# Patient Record
Sex: MALE | Race: WHITE | NOT HISPANIC OR LATINO | Employment: OTHER | URBAN - METROPOLITAN AREA
[De-identification: names, ages, dates, MRNs, and addresses within clinical notes are randomized per-mention and may not be internally consistent; named-entity substitution may affect disease eponyms.]

---

## 2017-12-04 ENCOUNTER — TELEPHONE (OUTPATIENT)
Dept: CARDIOLOGY | Facility: CLINIC | Age: 82
End: 2017-12-04

## 2017-12-04 NOTE — TELEPHONE ENCOUNTER
Called daughter back gave her information made an appointment with Dr Alcantar at [facility] for 12/18/17 at 330 pm . Daughter stated that she understands and agrees.

## 2017-12-04 NOTE — TELEPHONE ENCOUNTER
Tech from [facility] called stated that Dr Rebollar wants patient to follow up with  Dr. Lopez  In Two weeks.  explained that there is no availability in 2 wks . Explained that Dr Lopez will be leaving [facility] for [facility] in January.   Dr Richey and Dr Velasco do not have any opening until February 2018.   Asked Dayday (sp) is she will ask Dr Rebollar if patient can be scheduled with the [facility]iology [facility].  Gave her the phone number [phone].  Andrea Case Long an d LI.

## 2017-12-04 NOTE — TELEPHONE ENCOUNTER
Called patient's daughter explained that [facility] does not have any soon appointment.  Will look for Dr Alcantar and Dr Gunderson in [facility].  Patient was seeing Dr Duenas and wants to change.

## 2017-12-04 NOTE — TELEPHONE ENCOUNTER
----- Message from Shea Hood LPN sent at 12/4/2017  3:04 PM CST -----  Contact: Chely/Nathalie Benoit  Ms. Borrero,     I am just sending this to you because I see where you had already charted on this pt.     ----- Message -----  From: Oren HECTOR Edouard  Sent: 12/4/2017   2:50 PM  To: Kaiden WAGONER Staff    Chely called in regarding the attached patient and wanted to schedule a hospital f/u with Dr. Richey but not sure if Dr. Gunderson has every seen this patient?  Patient is being discharged today 12/4 with a diagnoses of Non-Sustained VT .  Chely asked patient be called directly [phone] or daughter ( Oren Martinez ) [phone]

## 2017-12-04 NOTE — TELEPHONE ENCOUNTER
----- Message from Oren Nunn sent at 12/4/2017  2:50 PM CST -----  Contact: Chely/Nathalie [facility]  Chely called in regarding the attached patient and wanted to schedule a hospital f/u with Dr. Richey but not sure if Dr. Gunderson has every seen this patient?  Patient is being discharged today 12/4 with a diagnoses of Non-Sustained VT .  Chely asked patient be called directly [phone] or daughter ( Oren Martinez ) [phone]

## 2017-12-13 ENCOUNTER — OFFICE VISIT (OUTPATIENT)
Dept: FAMILY MEDICINE | Facility: CLINIC | Age: 82
End: 2017-12-13
Payer: MEDICARE

## 2017-12-13 VITALS
TEMPERATURE: 98 F | DIASTOLIC BLOOD PRESSURE: 60 MMHG | HEIGHT: 73 IN | BODY MASS INDEX: 27.83 KG/M2 | HEART RATE: 68 BPM | RESPIRATION RATE: 20 BRPM | SYSTOLIC BLOOD PRESSURE: 112 MMHG | WEIGHT: 210 LBS

## 2017-12-13 DIAGNOSIS — E78.2 MIXED HYPERLIPIDEMIA: ICD-10-CM

## 2017-12-13 DIAGNOSIS — G60.8 PERIPHERAL SENSORY NEUROPATHY: ICD-10-CM

## 2017-12-13 DIAGNOSIS — I48.0 INTERMITTENT ATRIAL FIBRILLATION: ICD-10-CM

## 2017-12-13 DIAGNOSIS — L89.42: ICD-10-CM

## 2017-12-13 DIAGNOSIS — J18.9 PNEUMONIA DUE TO INFECTIOUS ORGANISM, UNSPECIFIED LATERALITY, UNSPECIFIED PART OF LUNG: ICD-10-CM

## 2017-12-13 DIAGNOSIS — M19.90 ARTHRITIS: ICD-10-CM

## 2017-12-13 DIAGNOSIS — K21.9 GASTROESOPHAGEAL REFLUX DISEASE, ESOPHAGITIS PRESENCE NOT SPECIFIED: ICD-10-CM

## 2017-12-13 PROBLEM — F03.90 DEMENTIA: Status: ACTIVE | Noted: 2017-12-13

## 2017-12-13 PROBLEM — E78.5 HYPERLIPIDEMIA: Status: ACTIVE | Noted: 2017-12-13

## 2017-12-13 PROCEDURE — 99204 OFFICE O/P NEW MOD 45 MIN: CPT | Mod: ,,, | Performed by: FAMILY MEDICINE

## 2017-12-13 RX ORDER — MULTIVITAMIN
1 TABLET ORAL DAILY
COMMUNITY
End: 2019-04-23

## 2017-12-13 RX ORDER — ALBUTEROL SULFATE 0.63 MG/3ML
0.63 SOLUTION RESPIRATORY (INHALATION) EVERY 6 HOURS PRN
Status: SHIPPED | OUTPATIENT
Start: 2017-12-13 | End: 2018-12-13

## 2017-12-13 RX ORDER — ASPIRIN 81 MG/1
81 TABLET ORAL DAILY
COMMUNITY
End: 2019-07-08

## 2017-12-13 RX ORDER — CIPROFLOXACIN 500 MG/1
500 TABLET ORAL 2 TIMES DAILY
Qty: 20 TABLET | Refills: 0 | Status: SHIPPED | OUTPATIENT
Start: 2017-12-13 | End: 2019-01-07

## 2017-12-13 RX ORDER — DILTIAZEM HYDROCHLORIDE 240 MG/1
CAPSULE, COATED, EXTENDED RELEASE ORAL
Refills: 0 | COMMUNITY
Start: 2017-12-04 | End: 2019-03-13

## 2017-12-13 RX ORDER — LANOLIN ALCOHOL/MO/W.PET/CERES
100 CREAM (GRAM) TOPICAL DAILY
COMMUNITY

## 2017-12-13 RX ORDER — DIAPER,BRIEF,ADULT, DISPOSABLE
EACH MISCELLANEOUS
COMMUNITY
End: 2019-04-23

## 2017-12-13 RX ORDER — AMOXICILLIN 500 MG
2 CAPSULE ORAL DAILY
COMMUNITY
End: 2019-04-23

## 2017-12-13 RX ORDER — TROSPIUM CHLORIDE 20 MG/1
TABLET, FILM COATED ORAL
Refills: 3 | COMMUNITY
Start: 2017-12-08 | End: 2019-01-07

## 2017-12-13 RX ORDER — GABAPENTIN 300 MG/1
CAPSULE ORAL
COMMUNITY
Start: 2017-10-31 | End: 2019-03-13

## 2017-12-13 RX ORDER — TRAMADOL HYDROCHLORIDE AND ACETAMINOPHEN 37.5; 325 MG/1; MG/1
TABLET, FILM COATED ORAL
COMMUNITY
Start: 2017-09-21 | End: 2017-12-13 | Stop reason: ALTCHOICE

## 2017-12-13 RX ORDER — DIAZEPAM 2 MG/1
TABLET ORAL
Refills: 5 | COMMUNITY
Start: 2017-11-20 | End: 2019-03-13

## 2017-12-13 RX ORDER — SOTALOL HYDROCHLORIDE 80 MG/1
TABLET ORAL
Refills: 0 | COMMUNITY
Start: 2017-12-04 | End: 2018-01-23

## 2017-12-13 NOTE — PROGRESS NOTES
Subjective:       Patient ID: Pasquale eRes is a 89 y.o. male.    Chief Complaint: Pneumonia and Hospital Follow Up (fluid in lungs)    Patient is here as a follow-up from the hospital November 25 through December 4 for pneumonia. Had Bilateral pneumonia. Still coughing, nofever.  Hx of atrial fibrillation, in hospital, converted of the medication.  He is a DNR.  Previuosly with Dr. Duenas.      Pneumonia       Review of Systems    Objective:      Physical Exam   Constitutional: He is oriented to person, place, and time. He appears well-developed and well-nourished.   HENT:   Head: Normocephalic and atraumatic.   Right Ear: External ear normal.   Left Ear: External ear normal.   Nose: Nose normal.   Mouth/Throat: Oropharynx is clear and moist. No oropharyngeal exudate.   Eyes: Conjunctivae and EOM are normal. Pupils are equal, round, and reactive to light. Right eye exhibits no discharge. Left eye exhibits no discharge.   Neck: Normal range of motion. Neck supple. No thyromegaly present.   Cardiovascular: Normal rate, regular rhythm and intact distal pulses.  Exam reveals no gallop and no friction rub.    Murmur (gd1/6 NAHOMY) heard.  Pulmonary/Chest: Effort normal and breath sounds normal. No respiratory distress. He has no wheezes. He has no rales. He exhibits no tenderness.   Abdominal: Soft. Bowel sounds are normal. He exhibits no distension and no mass. There is no tenderness. There is no rebound and no guarding. No hernia.   Musculoskeletal: Normal range of motion. He exhibits no edema, tenderness or deformity.        Back:         Legs:  Neurological: He is alert and oriented to person, place, and time. He displays normal reflexes. No cranial nerve deficit or sensory deficit. He exhibits normal muscle tone. Coordination normal.   Skin: Skin is warm and dry. No rash noted. No erythema. No pallor.   Nursing note and vitals reviewed.      Assessment:       1. Peripheral sensory neuropathy -D/C neurontin. No painful  symptoms   2. Mixed hyperlipidemia    3. Gastroesophageal reflux disease, esophagitis presence not specified    4. Arthritis    5. Intermittent atrial fibrillation    6. Pneumonia due to infectious organism, unspecified laterality, unspecified part of lung    7. Pressure ulcer of contiguous region involving back and buttock, stage 2, unspecified laterality        Plan:       Pasquale was seen today for pneumonia and hospital follow up.    Diagnoses and all orders for this visit:    Peripheral sensory neuropathy  -     Ambulatory referral to Home Health    Mixed hyperlipidemia    Gastroesophageal reflux disease, esophagitis presence not specified- Change to Mylanta    Arthritis    Intermittent atrial fibrillation  -     Holter monitor - 24 hour; Future    Pneumonia due to infectious organism, unspecified laterality, unspecified part of lung  -     X-Ray Chest PA And Lateral; Future  -     Ambulatory referral to Home Health  -     NEBULIZER FOR HOME USE  -     NEBULIZER KIT (SUPPLIES) FOR HOME USE    Pressure ulcer of contiguous region involving back and buttock, stage 2, unspecified laterality  -     Ambulatory referral to Home Health    Other orders  -     ciprofloxacin HCl (CIPRO) 500 MG tablet; Take 1 tablet (500 mg total) by mouth 2 (two) times daily.  -     albuterol nebulizer solution 0.63 mg; Take 3 mLs (0.63 mg total) by nebulization every 6 (six) hours as needed for Wheezing.         Return in about 3 months (around 3/13/2018).

## 2017-12-18 ENCOUNTER — OFFICE VISIT (OUTPATIENT)
Dept: CARDIOLOGY | Facility: CLINIC | Age: 82
End: 2017-12-18
Payer: MEDICARE

## 2017-12-18 VITALS
HEART RATE: 59 BPM | BODY MASS INDEX: 27.83 KG/M2 | WEIGHT: 210 LBS | SYSTOLIC BLOOD PRESSURE: 110 MMHG | DIASTOLIC BLOOD PRESSURE: 60 MMHG | HEIGHT: 73 IN

## 2017-12-18 DIAGNOSIS — I48.20 CHRONIC ATRIAL FIBRILLATION: ICD-10-CM

## 2017-12-18 DIAGNOSIS — R00.2 PALPITATIONS: ICD-10-CM

## 2017-12-18 DIAGNOSIS — I10 ESSENTIAL HYPERTENSION: ICD-10-CM

## 2017-12-18 DIAGNOSIS — I48.0 PAROXYSMAL ATRIAL FIBRILLATION: ICD-10-CM

## 2017-12-18 DIAGNOSIS — R60.0 EDEMA LEG: ICD-10-CM

## 2017-12-18 PROCEDURE — 99214 OFFICE O/P EST MOD 30 MIN: CPT | Mod: S$PBB,,, | Performed by: INTERNAL MEDICINE

## 2017-12-18 PROCEDURE — 99213 OFFICE O/P EST LOW 20 MIN: CPT | Mod: PBBFAC,PO | Performed by: INTERNAL MEDICINE

## 2017-12-18 PROCEDURE — 93010 ELECTROCARDIOGRAM REPORT: CPT | Mod: S$PBB,,, | Performed by: INTERNAL MEDICINE

## 2017-12-18 PROCEDURE — 99999 PR PBB SHADOW E&M-EST. PATIENT-LVL III: CPT | Mod: PBBFAC,,, | Performed by: INTERNAL MEDICINE

## 2017-12-18 PROCEDURE — 93005 ELECTROCARDIOGRAM TRACING: CPT | Mod: PBBFAC,PO | Performed by: INTERNAL MEDICINE

## 2017-12-18 RX ORDER — CIPROFLOXACIN 500 MG/1
TABLET ORAL
COMMUNITY
Start: 2017-12-13 | End: 2019-01-07

## 2017-12-18 RX ORDER — METOPROLOL SUCCINATE 50 MG/1
50 TABLET, EXTENDED RELEASE ORAL DAILY
Qty: 90 TABLET | Refills: 3 | Status: SHIPPED | OUTPATIENT
Start: 2017-12-18 | End: 2019-03-13

## 2017-12-18 RX ORDER — HYDROCHLOROTHIAZIDE 25 MG/1
25 TABLET ORAL DAILY
Qty: 90 TABLET | Refills: 3 | Status: SHIPPED | OUTPATIENT
Start: 2017-12-18 | End: 2019-01-18 | Stop reason: SDUPTHER

## 2017-12-18 RX ORDER — TROSPIUM CHLORIDE 20 MG/1
TABLET, FILM COATED ORAL
Refills: 3 | COMMUNITY
Start: 2017-12-08 | End: 2018-08-06 | Stop reason: SDUPTHER

## 2017-12-18 RX ORDER — SOTALOL HYDROCHLORIDE 80 MG/1
TABLET ORAL
COMMUNITY
Start: 2017-12-04 | End: 2017-12-18 | Stop reason: ALTCHOICE

## 2017-12-18 RX ORDER — DILTIAZEM HYDROCHLORIDE 240 MG/1
CAPSULE, COATED, EXTENDED RELEASE ORAL
COMMUNITY
Start: 2017-12-04 | End: 2017-12-18

## 2017-12-18 RX ORDER — DIAZEPAM 2 MG/1
TABLET ORAL
COMMUNITY
Start: 2017-11-20 | End: 2019-01-07

## 2017-12-18 NOTE — LETTER
December 18, 2017      Shila Chi MD  [address]  [address]  [facility] [place] [address]           [facility]  [address] [facility] [address] [place] [address]  Phone: [phone]          Patient: Pasquale Rees   MR Number: [id number]   YOB: 1925   Date of Visit: 12/18/2017       Dear Dr. Shila Chi:    Thank you for referring Pasquale Rees to me for evaluation. Attached you will find relevant portions of my assessment and plan of care.    If you have questions, please do not hesitate to call me. I look forward to following Pasquale Rees along with you.    Sincerely,    Al Alcantar Jr., MD    Enclosure  CC:  No Recipients    If you would like to receive this communication electronically, please contact [email or web address] or [phone] to request more information on [employer] access.    For providers and/or their staff who would like to refer a patient to [facility], please contact us through our one-stop-shop provider referral line, [facility], at [phone].    If you feel you have received this communication in error or would no longer like to receive these types of communications, please e-mail [email or web address]

## 2017-12-18 NOTE — PROGRESS NOTES
Subjective:    Patient ID:  Pasquale Rees is a 89 y.o. male who presents for evaluation of new pt (new pt) and ref from Dr Guadalupe      HPI 89 yo WM with recent hospitalization for pneumonia and found to be in AF. Patient has no palpitations, CP or SOB. Has chronic ankle edema. Family takes good care of him and were here to discuss long term management of the AF and risk vs benefit of anticoagulation. They were concerned because of fall hx.      Review of Systems   Constitution: Positive for malaise/fatigue. Negative for decreased appetite, fever, weakness, weight gain and weight loss.   HENT: Negative for hearing loss and nosebleeds.    Eyes: Negative for visual disturbance.   Cardiovascular: Positive for leg swelling. Negative for chest pain, claudication, cyanosis, dyspnea on exertion, irregular heartbeat, near-syncope, orthopnea, palpitations, paroxysmal nocturnal dyspnea and syncope.   Respiratory: Negative for cough, hemoptysis, shortness of breath, sleep disturbances due to breathing, snoring and wheezing.    Endocrine: Negative for cold intolerance, heat intolerance, polydipsia and polyuria.   Hematologic/Lymphatic: Negative for adenopathy and bleeding problem. Does not bruise/bleed easily.   Skin: Negative for color change, itching, poor wound healing, rash and suspicious lesions.   Musculoskeletal: Positive for arthritis. Negative for back pain, falls, joint pain, joint swelling, muscle cramps, muscle weakness and myalgias.   Gastrointestinal: Negative for bloating, abdominal pain, change in bowel habit, constipation, flatus, heartburn, hematemesis, hematochezia, hemorrhoids, jaundice, melena, nausea and vomiting.   Genitourinary: Negative for bladder incontinence, decreased libido, frequency, hematuria, hesitancy and urgency.   Neurological: Positive for loss of balance. Negative for brief paralysis, difficulty with concentration, excessive daytime sleepiness, dizziness, focal weakness, headaches,  "light-headedness, numbness and vertigo.   Psychiatric/Behavioral: Negative for altered mental status, depression and memory loss. The patient does not have insomnia and is not nervous/anxious.    Allergic/Immunologic: Negative for environmental allergies, hives and persistent infections.        Objective:    Physical Exam   Constitutional: He is oriented to person, place, and time. He appears well-developed and well-nourished. No distress.   /60 (BP Location: Left arm, Patient Position: Sitting, BP Method: Large (Automatic))   Pulse (!) 59   Ht 6' 1" (1.854 m)   Wt 95.3 kg (210 lb)   BMI 27.71 kg/m²      HENT:   Head: Normocephalic and atraumatic.   Eyes: Conjunctivae and lids are normal. Pupils are equal, round, and reactive to light. Right eye exhibits no discharge. No scleral icterus.   Neck: Normal range of motion. Neck supple. No JVD present. No tracheal deviation present. No thyromegaly present.   Cardiovascular: Normal rate, S1 normal, S2 normal, normal heart sounds and intact distal pulses.  A regularly irregular rhythm present.  Occasional extrasystoles are present. Exam reveals decreased pulses. Exam reveals no gallop and no friction rub.    No murmur heard.  Pulses:       Carotid pulses are 2+ on the right side, and 2+ on the left side.       Radial pulses are 2+ on the right side, and 2+ on the left side.        Femoral pulses are 2+ on the right side, and 2+ on the left side.       Popliteal pulses are 2+ on the right side, and 2+ on the left side.        Dorsalis pedis pulses are 2+ on the right side, and 2+ on the left side.        Posterior tibial pulses are 2+ on the right side, and 2+ on the left side.   Pulmonary/Chest: Effort normal and breath sounds normal. No respiratory distress. He has no wheezes. He has no rales. He exhibits no tenderness.   Abdominal: Soft. Bowel sounds are normal. He exhibits no distension and no mass. There is no hepatosplenomegaly or hepatomegaly. There is no " tenderness. There is no rebound and no guarding.   Musculoskeletal: Normal range of motion. He exhibits edema. He exhibits no tenderness.   Ankle edema   Lymphadenopathy:     He has no cervical adenopathy.   Neurological: He is alert and oriented to person, place, and time. He has normal reflexes. No cranial nerve deficit. Coordination normal.   Skin: Skin is warm and dry. No rash noted. He is not diaphoretic. No erythema. No pallor.   Psychiatric: He has a normal mood and affect. His speech is normal and behavior is normal. Judgment and thought content normal. Cognition and memory are normal.         Assessment:       1. Fall risk   2. Paroxysmal atrial fibrillation    3. Edema leg    4. Essential hypertension         Plan:    Dc sotolol and diltiazem   Restart metoprolol 50 mg  HCTZ 25 mg daily with leg elevation and support stockings    Risk of stroke from atrial fib has been discussed as well as bleeding risk from alternative anticoagulation regiments as well as risk and benefits of rate control vs cardioversion   TENA-VASC 3 3.2% stroke risk    Family and patient elected not to use anticoagulation     Orders Placed This Encounter   Procedures    Basic metabolic panel     Return in about 6 weeks (around 1/29/2018).

## 2017-12-21 DIAGNOSIS — R00.2 PALPITATIONS: Primary | ICD-10-CM

## 2018-01-11 ENCOUNTER — TELEPHONE (OUTPATIENT)
Dept: CARDIOLOGY | Facility: CLINIC | Age: 83
End: 2018-01-11

## 2018-01-11 DIAGNOSIS — I48.20 CHRONIC ATRIAL FIBRILLATION: Primary | ICD-10-CM

## 2018-01-11 NOTE — TELEPHONE ENCOUNTER
Left message on voicemail for pts daughter, letting them know blood work has been placed in system.

## 2018-01-11 NOTE — TELEPHONE ENCOUNTER
----- Message from Marielle Solorio MA sent at 1/10/2018  3:04 PM CST -----  Contact: Meche Martinez-Daughter  Can you please put order in for blood work to be done before pt is seen, thank you?  ----- Message -----  From: Amelia Del Cid  Sent: 1/10/2018   2:07 PM  To: Katharine YOUNG Jr Staff    Meche Martinez- pt Daughter calling states would like a call,pt have home health and would like blood work orders sent to the [facility] health please before pt comes in ,this week sometime....[phone]

## 2018-01-19 ENCOUNTER — TELEPHONE (OUTPATIENT)
Dept: FAMILY MEDICINE | Facility: CLINIC | Age: 83
End: 2018-01-19

## 2018-01-19 LAB
ALBUMIN SERPL-MCNC: 3.5 G/DL (ref 3.1–4.7)
ALP SERPL-CCNC: 87 IU/L (ref 40–104)
ALT (SGPT): 18 IU/L (ref 3–33)
AST SERPL-CCNC: 23 IU/L (ref 10–40)
BASOPHILS NFR BLD: 0.1 K/UL (ref 0–0.2)
BASOPHILS NFR BLD: 0.5 %
BILIRUB SERPL-MCNC: 0.6 MG/DL (ref 0.3–1)
BUN SERPL-MCNC: 16 MG/DL (ref 8–20)
CALCIUM SERPL-MCNC: 9.1 MG/DL (ref 7.7–10.4)
CHLORIDE: 102 MMOL/L (ref 98–110)
CO2 SERPL-SCNC: 31.8 MMOL/L (ref 22.8–31.6)
CREATININE: 0.79 MG/DL (ref 0.6–1.4)
EOSINOPHIL NFR BLD: 0.5 K/UL (ref 0–0.7)
EOSINOPHIL NFR BLD: 4.3 %
ERYTHROCYTE [DISTWIDTH] IN BLOOD BY AUTOMATED COUNT: 15.7 % (ref 11.7–14.9)
GLUCOSE: 103 MG/DL (ref 70–99)
GRAN #: 6.6 K/UL (ref 1.4–6.5)
GRAN%: 61.8 %
HCT VFR BLD AUTO: 38.8 % (ref 39–55)
HGB BLD-MCNC: 12.6 G/DL (ref 14–16)
IMMATURE GRANS (ABS): 0.1 K/UL (ref 0–1)
IMMATURE GRANULOCYTES: 0.5 %
LYMPH #: 2.9 K/UL (ref 1.2–3.4)
LYMPH%: 26.8 %
MCH RBC QN AUTO: 28.8 PG (ref 25–35)
MCHC RBC AUTO-ENTMCNC: 32.5 G/DL (ref 31–36)
MCV RBC AUTO: 88.6 FL (ref 80–100)
MONO #: 0.7 K/UL (ref 0.1–0.6)
MONO%: 6.1 %
NUCLEATED RBCS: 0 %
PLATELET # BLD AUTO: 249 K/UL (ref 140–440)
PMV BLD AUTO: 8.9 FL (ref 8.8–12.7)
POTASSIUM SERPL-SCNC: 3.5 MMOL/L (ref 3.5–5)
PROT SERPL-MCNC: 7.3 G/DL (ref 6–8.2)
RBC # BLD AUTO: 4.38 M/UL (ref 4.3–5.9)
SODIUM: 142 MMOL/L (ref 134–144)
WBC # BLD AUTO: 10.7 K/UL (ref 5–10)

## 2018-01-19 NOTE — TELEPHONE ENCOUNTER
----- Message from Alex Guadalupe MD sent at 1/19/2018 12:49 PM CST -----  Mild elevated blood sugar. 2200-calorie diabetic diet and rechecking 3 months for A1c and chemistries

## 2018-01-22 ENCOUNTER — TELEPHONE (OUTPATIENT)
Dept: CARDIOLOGY | Facility: CLINIC | Age: 83
End: 2018-01-22

## 2018-01-22 DIAGNOSIS — J18.9 PNEUMONIA OF LOWER LOBE DUE TO INFECTIOUS ORGANISM, UNSPECIFIED LATERALITY: Primary | ICD-10-CM

## 2018-01-22 NOTE — TELEPHONE ENCOUNTER
Appt [facility] for pt. For chest xray.     ----- Message from Amelia Del Cid sent at 1/22/2018 10:06 AM CST -----  Contact: Daughter Meche  Pt daughter Meche calling states pt has appointment tomorrow and she is requesting a chest xray be done since pt had pneumonia a couple weeks so would like to see how his lungs are doing....[phone]

## 2018-01-22 NOTE — TELEPHONE ENCOUNTER
----- Message from Marielle Solorio MA sent at 1/22/2018 10:51 AM CST -----  Contact: Daughter Meche  Family would like Chest xray done before appt.   ----- Message -----  From: Amelia Del Cid  Sent: 1/22/2018  10:06 AM  To: Katharine YOUNG Jr Staff    Pt daughter Meche calling states pt has appointment tomorrow and she is requesting a chest xray be done since pt had pneumonia a couple weeks so would like to see how his lungs are doing....[phone]

## 2018-01-23 ENCOUNTER — HOSPITAL ENCOUNTER (OUTPATIENT)
Dept: RADIOLOGY | Facility: HOSPITAL | Age: 83
Discharge: HOME | End: 2018-01-23
Attending: INTERNAL MEDICINE
Payer: MEDICARE

## 2018-01-23 ENCOUNTER — OFFICE VISIT (OUTPATIENT)
Dept: CARDIOLOGY | Facility: CLINIC | Age: 83
End: 2018-01-23
Payer: MEDICARE

## 2018-01-23 ENCOUNTER — TELEPHONE (OUTPATIENT)
Dept: CARDIOLOGY | Facility: CLINIC | Age: 83
End: 2018-01-23

## 2018-01-23 ENCOUNTER — TELEPHONE (OUTPATIENT)
Dept: FAMILY MEDICINE | Facility: CLINIC | Age: 83
End: 2018-01-23

## 2018-01-23 VITALS
HEART RATE: 83 BPM | BODY MASS INDEX: 27.47 KG/M2 | HEIGHT: 73 IN | DIASTOLIC BLOOD PRESSURE: 80 MMHG | WEIGHT: 207.25 LBS | SYSTOLIC BLOOD PRESSURE: 136 MMHG

## 2018-01-23 DIAGNOSIS — R60.0 EDEMA LEG: ICD-10-CM

## 2018-01-23 DIAGNOSIS — I10 ESSENTIAL HYPERTENSION: Primary | ICD-10-CM

## 2018-01-23 DIAGNOSIS — I48.0 PAROXYSMAL ATRIAL FIBRILLATION: ICD-10-CM

## 2018-01-23 DIAGNOSIS — J18.9 PNEUMONIA OF LOWER LOBE DUE TO INFECTIOUS ORGANISM, UNSPECIFIED LATERALITY: ICD-10-CM

## 2018-01-23 PROCEDURE — 99999 PR PBB SHADOW E&M-EST. PATIENT-LVL IV: CPT | Mod: PBBFAC,,, | Performed by: INTERNAL MEDICINE

## 2018-01-23 PROCEDURE — 71046 X-RAY EXAM CHEST 2 VIEWS: CPT | Mod: TC,FY,PO

## 2018-01-23 PROCEDURE — 99214 OFFICE O/P EST MOD 30 MIN: CPT | Mod: PBBFAC,PO | Performed by: INTERNAL MEDICINE

## 2018-01-23 PROCEDURE — 99214 OFFICE O/P EST MOD 30 MIN: CPT | Mod: S$PBB,,, | Performed by: INTERNAL MEDICINE

## 2018-01-23 PROCEDURE — 71046 X-RAY EXAM CHEST 2 VIEWS: CPT | Mod: 26,,, | Performed by: RADIOLOGY

## 2018-01-23 NOTE — TELEPHONE ENCOUNTER
Spoke with daughter to let her know we have test results.       ----- Message from Mildred Navarro sent at 1/23/2018  9:48 AM CST -----  Daughter (Oren Martinez)requesting to speak with nurse concerning patient's lab test results that were done by [facility]/has question/please call back at [phone] (cellphone)to advise.

## 2018-01-23 NOTE — PROGRESS NOTES
Subjective:    Patient ID:  Pasquale Rees is a 89 y.o. male who presents for evaluation of Follow-up (follow up ) and Results      HPI  89 yo WM with recent hospitalization for pneumonia and found to be in AF. Patient has no palpitations, CP or SOB. Has chronic ankle edema. Family takes good care of him and were here to discuss long term management of the AF and risk vs benefit of anticoagulation.Last visit placed him on metoprolol and HCTZ. Family opted to not anticoagulate. He is doing well today for his age. He denies any palpitations. Still gets some lower extremity edema but improved.    Review of Systems   Constitution: Negative for decreased appetite, fever, weakness, malaise/fatigue, weight gain and weight loss.   HENT: Negative for hearing loss and nosebleeds.    Eyes: Negative for visual disturbance.   Cardiovascular: Positive for dyspnea on exertion and leg swelling. Negative for chest pain, claudication, cyanosis, irregular heartbeat, near-syncope, orthopnea, palpitations, paroxysmal nocturnal dyspnea and syncope.   Respiratory: Negative for cough, hemoptysis, shortness of breath, sleep disturbances due to breathing, snoring and wheezing.    Endocrine: Negative for cold intolerance, heat intolerance, polydipsia and polyuria.   Hematologic/Lymphatic: Negative for adenopathy and bleeding problem. Does not bruise/bleed easily.   Skin: Negative for color change, itching, poor wound healing, rash and suspicious lesions.   Musculoskeletal: Negative for arthritis, back pain, falls, joint pain, joint swelling, muscle cramps, muscle weakness and myalgias.   Gastrointestinal: Negative for bloating, abdominal pain, change in bowel habit, constipation, flatus, heartburn, hematemesis, hematochezia, hemorrhoids, jaundice, melena, nausea and vomiting.   Genitourinary: Negative for bladder incontinence, decreased libido, frequency, hematuria, hesitancy and urgency.   Neurological: Negative for brief paralysis,  "difficulty with concentration, excessive daytime sleepiness, dizziness, focal weakness, headaches, light-headedness, loss of balance, numbness and vertigo.   Psychiatric/Behavioral: Negative for altered mental status, depression and memory loss. The patient does not have insomnia and is not nervous/anxious.    Allergic/Immunologic: Negative for environmental allergies, hives and persistent infections.        Objective:    Physical Exam   Constitutional: He is oriented to person, place, and time. He appears well-developed and well-nourished. No distress.   /80 (BP Location: Left arm, Patient Position: Sitting, BP Method: Large (Automatic))   Pulse 83   Ht 6' 1" (1.854 m)   Wt 94 kg (207 lb 3.7 oz)   BMI 27.34 kg/m²      HENT:   Head: Normocephalic and atraumatic.   Eyes: Conjunctivae and lids are normal. Pupils are equal, round, and reactive to light. Right eye exhibits no discharge. No scleral icterus.   Neck: Normal range of motion. Neck supple. No JVD present. No tracheal deviation present. No thyromegaly present.   Cardiovascular: Normal rate, S1 normal, S2 normal, normal heart sounds, intact distal pulses and normal pulses.  An irregularly irregular rhythm present. Exam reveals no gallop and no friction rub.    No murmur heard.  Pulses:       Carotid pulses are 2+ on the right side, and 2+ on the left side.       Radial pulses are 2+ on the right side, and 2+ on the left side.        Femoral pulses are 2+ on the right side, and 2+ on the left side.       Popliteal pulses are 2+ on the right side, and 2+ on the left side.        Dorsalis pedis pulses are 2+ on the right side, and 2+ on the left side.        Posterior tibial pulses are 2+ on the right side, and 2+ on the left side.   Pulmonary/Chest: Effort normal and breath sounds normal. No respiratory distress. He has no wheezes. He has no rales. He exhibits no tenderness.   Abdominal: Soft. Bowel sounds are normal. He exhibits no distension and no " mass. There is no hepatosplenomegaly or hepatomegaly. There is no tenderness. There is no rebound and no guarding.   Musculoskeletal: Normal range of motion. He exhibits edema. He exhibits no tenderness.   1+ to trace   Lymphadenopathy:     He has no cervical adenopathy.   Neurological: He is alert and oriented to person, place, and time. He has normal reflexes. No cranial nerve deficit. Coordination normal.   Skin: Skin is warm and dry. No rash noted. He is not diaphoretic. No erythema. No pallor.   Psychiatric: He has a normal mood and affect. His speech is normal and behavior is normal. Judgment and thought content normal. Cognition and memory are normal.         Assessment:       1. Essential hypertension    2. Paroxysmal atrial fibrillation    3. Edema leg         Plan:     Patient probably in chronic atria fib now. Tolerating rate control    Not anticoagulated because of dementia, fall risk, and family's decision    No orders of the defined types were placed in this encounter.    Follow-up in about 6 months (around 7/23/2018).

## 2018-03-12 ENCOUNTER — TELEPHONE (OUTPATIENT)
Dept: CARDIOLOGY | Facility: CLINIC | Age: 83
End: 2018-03-12

## 2018-03-12 NOTE — TELEPHONE ENCOUNTER
----- Message from Marielle Solorio MA sent at 3/12/2018  1:51 PM CDT -----  Contact: [facility]  Please advise if home health needs to do anything   ----- Message -----  From: Rosita Ordoñez  Sent: 3/12/2018   1:40 PM  To: Katharine YOUNG  Staff    Patient is gaining weight today 216 and 217 on 2/27.  Please be advised and if needed call back at [phone].  Thank you!

## 2018-06-04 ENCOUNTER — TELEPHONE (OUTPATIENT)
Dept: FAMILY MEDICINE | Facility: CLINIC | Age: 83
End: 2018-06-04

## 2018-06-04 LAB
BUN SERPL-MCNC: 17 MG/DL (ref 8–20)
CALCIUM SERPL-MCNC: 9.1 MG/DL (ref 7.7–10.4)
CHLORIDE: 99 MMOL/L (ref 98–110)
CO2 SERPL-SCNC: 33.2 MMOL/L (ref 22.8–31.6)
CREATININE: 1.03 MG/DL (ref 0.6–1.4)
GLUCOSE: 129 MG/DL (ref 70–99)
POTASSIUM SERPL-SCNC: 3.5 MMOL/L (ref 3.5–5)
SODIUM: 141 MMOL/L (ref 134–144)

## 2018-06-04 NOTE — TELEPHONE ENCOUNTER
cody by Carol because patient had a reported 12 pound weight gainover the last week of his had no chest pains or shortness of breath he has a history of atrial fibrillation but she says his rate is regular today and is not tachycardic and have no capabilities to do EKG at the bedside. I instructed her to give him a short course of Lasix 10 mg every morning for 5 days, and to call me if there is any change in his vital signs or any change in his heart rhythm or distress if his symptoms do not improve after several days he is to go to the ER.

## 2018-06-04 NOTE — TELEPHONE ENCOUNTER
Carol with [facility] called to state she saw the patient on Friday and his weight is increasing. Weight on admit 5/25/18 was 198lb and its now 210lbs as of Friday. No increase SOB, O2 95%, VS ok. Has 2-3 plus pitting bilateral extremities.

## 2018-07-09 ENCOUNTER — TELEPHONE (OUTPATIENT)
Dept: FAMILY MEDICINE | Facility: CLINIC | Age: 83
End: 2018-07-09

## 2018-07-09 NOTE — TELEPHONE ENCOUNTER
Briana with [facility] called regarding patient.  She is reporting a 3 # weight gain since last week and +2 pitting edema in lower extremities.  Please call Briana   [phone]

## 2018-08-06 RX ORDER — TROSPIUM CHLORIDE 20 MG/1
20 TABLET, FILM COATED ORAL 2 TIMES DAILY
Qty: 60 TABLET | Refills: 3 | Status: SHIPPED | OUTPATIENT
Start: 2018-08-06 | End: 2019-03-25 | Stop reason: SDUPTHER

## 2018-08-09 RX ORDER — TROSPIUM CHLORIDE 20 MG/1
TABLET, FILM COATED ORAL
Qty: 30 TABLET | Refills: 3 | Status: SHIPPED | OUTPATIENT
Start: 2018-08-09 | End: 2019-01-07

## 2018-08-10 NOTE — TELEPHONE ENCOUNTER
The following medication needs a prior authorization: see request from pharmacy in [place]     Medication Name: trospium Chloride   Dosage: 20 mg     Frequency: everyday     Directions for use: one tablet po q day    Diagnosis: r39.81  N32.81 OAB    Is the request for a reauthorization? no    Is the patient currently stable on therapy? Yes patient has been stable on medication for over a year     Please list all therapeutic alternatives previously used with start/end dates and outcome:    Oxybutynin failed

## 2018-08-27 ENCOUNTER — LAB VISIT (OUTPATIENT)
Dept: LAB | Facility: HOSPITAL | Age: 83
End: 2018-08-27
Attending: RADIOLOGY
Payer: MEDICARE

## 2018-08-27 DIAGNOSIS — C43.39 MALIGNANT MELANOMA OF SKIN OF CHEEK (EXTERNAL): Primary | ICD-10-CM

## 2018-08-27 LAB
BUN SERPL-MCNC: 18 MG/DL
CREAT SERPL-MCNC: 0.95 MG/DL
EST. GFR  (AFRICAN AMERICAN): >60 ML/MIN/1.73 M^2
EST. GFR  (NON AFRICAN AMERICAN): >60 ML/MIN/1.73 M^2

## 2018-08-27 PROCEDURE — 84520 ASSAY OF UREA NITROGEN: CPT | Mod: PN

## 2018-08-27 PROCEDURE — 82565 ASSAY OF CREATININE: CPT | Mod: PN

## 2018-08-27 PROCEDURE — 36415 COLL VENOUS BLD VENIPUNCTURE: CPT | Mod: PN

## 2018-08-27 PROCEDURE — 84520 ASSAY OF UREA NITROGEN: CPT

## 2018-08-27 PROCEDURE — 82565 ASSAY OF CREATININE: CPT

## 2018-09-06 NOTE — PROGRESS NOTES
"Patient: Pasquale Rees  Date of visit: 6/11/2018    Referring MD: Brijesh Dumas MD  Radiation Oncology: MD Scarlet  Medical Oncology:  Cardiologist: Dr Alcantar  PCP/Internist:  Dermatologist: Dr María Novoa  Dentist: Dr Rufus Wang    HPI:  Patient is an elderly male with a hx of SCCA left face S/P Moh's procedure in the past about 2 years ago (Dr Newsome--[place], [place] Negative margins. Closed primarily.  He presented with a "knot" on his left face a few months ago which continues to grow. No DC, pain, ulceration. No facial numbness or weakness.  He was seen by Dr. FARNAZ Dumas who evaluated patient: FNA + SCCA; PET CT neck, CT neck with contrast  He now presents with a new lesion near his left sideburn with a subcutaneous mass anterior face and mass in tail of parotid and an enlarged node left upper neck.    He also has another lesion in his left sideburn for which Moh's surgery has been put on hold--path ??    Upon review of his PMH it was discovered that his cardiac arrhythmias presented only when he was admitted to [facility] for pneumonia. He denies dyspnea. He also denies a cardiac history and chest pain. His diagnosis of dementia again was made when he was "sun downing" in the ICU. His family states that his mental status is clear--no dementia.    He was hospitalized twice for penumonia: 11/27/2017--[facility]--pneumonia following flu  5/2018-[place]--aspiration--MBSS was performed at that time.  All believe that this was due to eating too fast while simultaneously talking.  He occasionally coughs with food.    ROS: ALSO SEE HPI    ROS: Review of Systems   Constitutional: Negative.    HENT: Negative for congestion, ear discharge, ear pain, hearing loss, nosebleeds, sinus pain and sore throat.    Eyes: Negative.   Swallowing: Negative for dysphagia and odynophagia   Respiratory: Negative.  Negative for stridor and dyspnea    Cardiovascular: Negative. "    Gastrointestinal: Negative.    Genitourinary: Negative.    Musculoskeletal: Negative for falls, + upper and lower back pain   Skin: Negative.    Neurological: Negative; no facial numbness or weakness.    Endo/Heme/Allergies: Negative for polydipsia. Does not bruise/bleed easily.   Psychiatric/Behavioral: Negative.  Substance abuse:  Negative    Constitutional  · General Appearance: well nourished, well-developed, alert, oriented, in no acute distress; elderly male  · Communication: ability, understanding, voice quality normal  Head and Face  · Inspection: normocephalic, 2 X 2 skin lesion left sideburn covered with hair; 4 X 4 cm subQ nodule with atrophy of overlying skin located anterior to the parotid gland; mobile; NT; well healed vertical incision left preauricular area   · Palpation: no stepoffs, sinus tenderness or masses  · Parotid glands: firm mass tail of left parotid at posterior left AOM  Eyes  · Ocular Motility / Alignment: normal alignment, motility, no proptosis, enophthalmus or nystagmus  · Conjunctiva: not injected  · Eyelids: no hooding, lag, entropion, or ectropion  Ears  · Hearing: speech reception thresholds grossly normal  · External Ears: no auricle lesions, non-tender, mobile to palpation  · Otoscopy:  · Right Ear: no tympanic membrane lesions, perforations, or effusion, normal EAC  · Left Ear: no tympanic membrane lesions, perforations, or effusion, normal EAC  Nose  · External Nose: no lesions, tenderness, trauma or deformity  · Intranasal Exam: no edema, erythema, discharge, mass or obstruction  Oral Cavity / Oropharynx  · Lips: upper and lower lips pink and moist  · Teeth: dentition: broken tooth #15; black tooth #1 but intact; few mandibular teeth missing  · Gingiva: healthy  · Oral Mucosa: moist, no mucosal lesions  · Floor of Mouth: normal, no lesions, salivary ducts patent  · Tongue: moist, normal mobility, no lesions  · Palate: soft and hard palates without lesions or  ulcers  Oropharynx: tonsils and walls without erythema, exudate, base of tongue soft to palpation  Nasopharynx, Hypopharynx, and Larynx  · Indirect: larynx, hypopharynx, base of tongue normal  Neck  · Inspection and Palpation: no erythema, induration, emphysema, tenderness or masses; minimal neck extension  · Larynx and Trachea: normal position, mobility and crepitus  · Thyroid: no tenderness, enlargement or nodules  · Submandibular Glands: no masses or tenderness  Lymphatic:  · Anterior, Posterior, Submandibular, Submental, Supraclavicular: enlarged left level 2 node  Chest / Respiratory  · Chest: no stridor or retractions, normal effort and expansion  Cardiovascular:  · Pulses: 2+ carotid pulses bilaterally  · Auscultation: deferred  Neurological  · Cranial Nerves: grossly intact  · General: no focal deficits  Psychiatric  · Orientation: oriented to time, place and person; no signs of dementia  · Mood and Affect: no depression, anxiety or agitation  Extremities  · Inspection: Moves all extremities well  Donor site  · Chest, Back, Abdomen, Arms, Legs: N/A      PERTINENT DATA:  LABS:  AUDIO:  PATH: SCCA  IMAGING:  US:  CT: same as PET CT  MRI:  PET CT: no distant mets; lesion right face, tail of parotid and questionable level 2 node vs  additional mass parotid       I personally reviewed the images: PET CT, CT neck with contrast     I personally reviewed: PET CT; CT neck with contrast    Assessment:  SCCA skin left sideburn area  Sub Q deposit SCCA left lower face anterior to parotid with mets to parotid and level 2 neck    Plan:  Treatment options discussed: do nothing, radiation only, surgery( WLE of SCCA left sideburn area, WLE sub Q deposit of SCCA left lower face, parotidectomy, neck dissection, cervicofacial advancement flap) + radiation therapy, the latter being the standard of care  Risks/complications, benefits of surgery ad alternatives discussed at length with patient and his family including discussion  of anatomy and all questions were answered  He will meet again with Dr Novak to discuss risks and benefits of XRT and its efficacy if he proceeds with radiation therapy only  F/U 2 weeks after speaking with Dr Novak    I spent 55 minutes face to face with patient discussing diagnosis and treatment plan and answering questions which was > 50% of the time examining patient.

## 2018-09-11 ENCOUNTER — OFFICE VISIT (OUTPATIENT)
Dept: OTOLARYNGOLOGY | Facility: CLINIC | Age: 83
End: 2018-09-11
Payer: MEDICARE

## 2018-09-11 VITALS
BODY MASS INDEX: 28.49 KG/M2 | DIASTOLIC BLOOD PRESSURE: 72 MMHG | WEIGHT: 215 LBS | HEIGHT: 73 IN | SYSTOLIC BLOOD PRESSURE: 122 MMHG

## 2018-09-11 DIAGNOSIS — C44.300 SKIN CANCER OF FACE: ICD-10-CM

## 2018-09-11 PROBLEM — I48.0 PAROXYSMAL ATRIAL FIBRILLATION: Status: RESOLVED | Noted: 2017-12-18 | Resolved: 2018-09-11

## 2018-09-11 PROBLEM — I48.0 INTERMITTENT ATRIAL FIBRILLATION: Status: RESOLVED | Noted: 2017-12-13 | Resolved: 2018-09-11

## 2018-09-11 PROCEDURE — 99205 OFFICE O/P NEW HI 60 MIN: CPT | Mod: S$PBB,,, | Performed by: OTOLARYNGOLOGY

## 2018-09-11 PROCEDURE — 99215 OFFICE O/P EST HI 40 MIN: CPT | Mod: PBBFAC,PO | Performed by: OTOLARYNGOLOGY

## 2018-09-11 PROCEDURE — 99999 PR PBB SHADOW E&M-EST. PATIENT-LVL V: CPT | Mod: PBBFAC,,, | Performed by: OTOLARYNGOLOGY

## 2018-09-11 NOTE — LETTER
September 11, 2018      Brijesh Dumas MD  [address]  [address]  [facility] [address]           [facility]  [address] [facility] [address] [place] [address]  Phone: [phone]  Fax: [phone]          Patient: Pasquale Rees   MR Number: [id number]   YOB: 1925   Date of Visit: 9/11/2018       Dear Dr. Brijesh Dumas:    Thank you for referring Pasquale Rees to me for evaluation. Attached you will find relevant portions of my assessment and plan of care.    If you have questions, please do not hesitate to call me. I look forward to following Pasquale Rees along with you.    Sincerely,    Shantel Simms MD    Enclosure  CC:  No Recipients    If you would like to receive this communication electronically, please contact [email or web address] or [phone] to request more information on [employer] Link access.    For providers and/or their staff who would like to refer a patient to [facility], please contact us through our one-stop-shop provider referral line, [facility], at [phone].    If you feel you have received this communication in error or would no longer like to receive these types of communications, please e-mail [email or web address]

## 2018-09-12 ENCOUNTER — TELEPHONE (OUTPATIENT)
Dept: OTOLARYNGOLOGY | Facility: CLINIC | Age: 83
End: 2018-09-12

## 2018-09-12 NOTE — TELEPHONE ENCOUNTER
----- Message from Shantel Simms MD sent at 9/11/2018  5:10 PM CDT -----  Please send note to care team.  I sent it electronically already but don't know if received  Thanks

## 2018-10-11 ENCOUNTER — LAB VISIT (OUTPATIENT)
Dept: LAB | Facility: HOSPITAL | Age: 83
End: 2018-10-11
Attending: RADIOLOGY
Payer: MEDICARE

## 2018-10-11 ENCOUNTER — TELEPHONE (OUTPATIENT)
Dept: OTOLARYNGOLOGY | Facility: CLINIC | Age: 83
End: 2018-10-11

## 2018-10-11 DIAGNOSIS — C44.329 SQUAMOUS CELL CARCINOMA OF CHIN: Primary | ICD-10-CM

## 2018-10-11 LAB
BUN SERPL-MCNC: 19 MG/DL
CREAT SERPL-MCNC: 1.04 MG/DL
EST. GFR  (AFRICAN AMERICAN): >60 ML/MIN/1.73 M^2
EST. GFR  (NON AFRICAN AMERICAN): >60 ML/MIN/1.73 M^2

## 2018-10-11 PROCEDURE — 84520 ASSAY OF UREA NITROGEN: CPT

## 2018-10-11 PROCEDURE — 84520 ASSAY OF UREA NITROGEN: CPT | Mod: PN

## 2018-10-11 PROCEDURE — 82565 ASSAY OF CREATININE: CPT | Mod: PN

## 2018-10-11 PROCEDURE — 82565 ASSAY OF CREATININE: CPT

## 2018-10-11 PROCEDURE — 36415 COLL VENOUS BLD VENIPUNCTURE: CPT | Mod: PN

## 2018-10-11 NOTE — TELEPHONE ENCOUNTER
I spoke with Dr Novak regarding patient.  I had not heard back from him regarding his decision regarding treatment.  He has decided to proceed with non surgical therapy, i.e. , radiation therapy.

## 2018-12-04 ENCOUNTER — OFFICE VISIT (OUTPATIENT)
Dept: FAMILY MEDICINE | Facility: CLINIC | Age: 83
End: 2018-12-04
Payer: MEDICARE

## 2018-12-04 VITALS
BODY MASS INDEX: 27.77 KG/M2 | RESPIRATION RATE: 16 BRPM | DIASTOLIC BLOOD PRESSURE: 64 MMHG | HEART RATE: 73 BPM | HEIGHT: 73 IN | WEIGHT: 209.5 LBS | SYSTOLIC BLOOD PRESSURE: 122 MMHG | OXYGEN SATURATION: 95 %

## 2018-12-04 DIAGNOSIS — R05.9 COUGH: Primary | ICD-10-CM

## 2018-12-04 PROCEDURE — 99213 OFFICE O/P EST LOW 20 MIN: CPT | Mod: ,,, | Performed by: NURSE PRACTITIONER

## 2018-12-04 RX ORDER — GUAIFENESIN 1200 MG/1
1 TABLET, EXTENDED RELEASE ORAL 2 TIMES DAILY
Qty: 60 TABLET | COMMUNITY
Start: 2018-12-04 | End: 2018-12-14

## 2018-12-04 NOTE — PROGRESS NOTES
SUBJECTIVE:      Patient ID: Pasquale Rees is a 89 y.o. male.    Chief Complaint: Cough    Mr. Rees is here today for evaluation of a cough.  He reports that is started about a week ago and has been getting a little worse.  He reports some difficulty swallowing as well.  He has been undergoing radiation to his head and neck area for a squamous cell cancer.      Cough   This is a new problem. The current episode started in the past 7 days. The problem has been gradually worsening. The cough is productive of sputum. Associated symptoms include weight loss (since starting radiation therapy). Pertinent negatives include no chest pain, chills, ear congestion, ear pain, fever, headaches, heartburn, hemoptysis, myalgias, nasal congestion, postnasal drip, rash, rhinorrhea, sore throat, shortness of breath, sweats or wheezing. The symptoms are aggravated by lying down. He has tried nothing for the symptoms.       Past Surgical History:   Procedure Laterality Date    APPENDECTOMY      EYE SURGERY      HERNIA REPAIR      JOINT REPLACEMENT      SPINE SURGERY      TOTAL KNEE ARTHROPLASTY      2     History reviewed. No pertinent family history.   Social History     Socioeconomic History    Marital status:      Spouse name: None    Number of children: None    Years of education: None    Highest education level: None   Social Needs    Financial resource strain: None    Food insecurity - worry: None    Food insecurity - inability: None    Transportation needs - medical: None    Transportation needs - non-medical: None   Occupational History    None   Tobacco Use    Smoking status: Never Smoker    Smokeless tobacco: Never Used   Substance and Sexual Activity    Alcohol use: No     Comment: occ    Drug use: No    Sexual activity: None   Other Topics Concern    None   Social History Narrative    ** Merged History Encounter **          Current Outpatient Medications   Medication Sig Dispense Refill     aspirin 81 MG chewable tablet Every day      cholecalciferol, vitamin D3, 1,000 unit capsule Every day      cranberry conc-ascorbic acid 4,200-20 mg Cap Take by mouth.      cyanocobalamin (VITAMIN B-12) 1000 MCG tablet Take 100 mcg by mouth once daily.      diazePAM (VALIUM) 2 MG tablet   5    diltiaZEM (CARDIZEM CD) 240 MG 24 hr capsule   0    fish oil-omega-3 fatty acids 300-1,000 mg capsule Take 2 g by mouth once daily.      hydroCHLOROthiazide (HYDRODIURIL) 25 MG tablet Take 1 tablet (25 mg total) by mouth once daily. 90 tablet 3    metoprolol succinate (TOPROL-XL) 50 MG 24 hr tablet Take 1 tablet (50 mg total) by mouth once daily. 90 tablet 3    multivitamin (THERAGRAN) per tablet Take 1 tablet by mouth once daily.      trospium (SANCTURA) 20 mg Tab tablet   3    ZINC SULFATE (ZINC-15 ORAL) Take by mouth.      aspirin (ECOTRIN) 81 MG EC tablet Take 81 mg by mouth once daily.      ciprofloxacin HCl (CIPRO) 500 MG tablet       ciprofloxacin HCl (CIPRO) 500 MG tablet Take 1 tablet (500 mg total) by mouth 2 (two) times daily. 20 tablet 0    diazePAM (VALIUM) 2 MG tablet       gabapentin (NEURONTIN) 300 MG capsule       garlic 1 mg Cap Every day      glucosamine-chondroit-vit C-Mn (GLUCOSAMINE CHONDROITIN MAXSTR) 500-400 mg Cap Every day      guaiFENesin (MUCINEX) 1,200 mg Ta12 Take 1 tablet by mouth 2 (two) times daily. for 10 days 60 tablet prn    omega-3 acid ethyl esters (LOVAZA) 1 gram capsule Take by mouth.      omega-3 fatty acids 1,000 mg Cap Every day      trospium (SANCTURA) 20 mg Tab tablet Take 1 tablet (20 mg total) by mouth 2 (two) times daily. 60 tablet 3    trospium (SANCTURA) 20 mg Tab tablet TAKE ONE TABLET BY MOUTH ONCE A DAY 30 tablet 3    vitamin A-vit C-vit E-zinc-Cu Tab Take by mouth.       Current Facility-Administered Medications   Medication Dose Route Frequency Provider Last Rate Last Dose    albuterol nebulizer solution 0.63 mg  0.63 mg Nebulization Q6H PRN  Alex Guadalupe MD         Review of patient's allergies indicates:   Allergen Reactions    Codeine Nausea And Vomiting      Past Medical History:   Diagnosis Date    Anxiety     Arthritis     Blood transfusion     Cataract     Cataract     Dementia     temporary due to stay in ICU    GERD (gastroesophageal reflux disease)     Hyperlipidemia     Hypertension     Neuromuscular disorder     Pneumonia 11/2017; 5/2-18    hospitalized for both    Skin cancer of face 9/11/2018     Past Surgical History:   Procedure Laterality Date    APPENDECTOMY      EYE SURGERY      HERNIA REPAIR      JOINT REPLACEMENT      SPINE SURGERY      TOTAL KNEE ARTHROPLASTY      2       Review of Systems   Constitutional: Positive for activity change, appetite change, fatigue and weight loss (since starting radiation therapy). Negative for chills, fever and unexpected weight change.   HENT: Positive for trouble swallowing. Negative for congestion, ear pain, postnasal drip, rhinorrhea, sinus pressure, sinus pain and sore throat.    Eyes: Negative for pain, discharge and visual disturbance.   Respiratory: Positive for cough. Negative for apnea, hemoptysis, shortness of breath and wheezing.    Cardiovascular: Negative for chest pain and palpitations.   Gastrointestinal: Negative for abdominal pain, constipation, diarrhea, heartburn, nausea and vomiting.   Endocrine: Negative for polydipsia, polyphagia and polyuria.   Musculoskeletal: Negative for arthralgias, gait problem and myalgias.   Skin: Negative for color change, pallor and rash.   Allergic/Immunologic: Negative for immunocompromised state.   Neurological: Positive for weakness. Negative for dizziness, syncope, numbness and headaches.   Hematological: Negative for adenopathy.   Psychiatric/Behavioral: Negative for self-injury and suicidal ideas.      OBJECTIVE:      Vitals:    12/04/18 1347   BP: 122/64   Pulse: 73   Resp: 16   SpO2: 95%   Weight: 95 kg (209 lb 8 oz)  "  Height: 6' 1" (1.854 m)     Physical Exam   Constitutional: He is oriented to person, place, and time. He appears well-developed and well-nourished. No distress.   HENT:   Head: Normocephalic and atraumatic.   Right Ear: Tympanic membrane, external ear and ear canal normal.   Left Ear: Tympanic membrane, external ear and ear canal normal.   Nose: Nose normal. No mucosal edema or rhinorrhea.   Mouth/Throat: Oropharynx is clear and moist. No oropharyngeal exudate.   Eyes: Conjunctivae, EOM and lids are normal. Pupils are equal, round, and reactive to light. Right eye exhibits no discharge. Left eye exhibits no discharge. No scleral icterus.   Neck: Normal range of motion. Neck supple. Carotid bruit is not present. No tracheal deviation present. No thyromegaly present.   Cardiovascular: Normal rate, regular rhythm and normal heart sounds. Exam reveals no gallop and no friction rub.   No murmur heard.  Pulmonary/Chest: Effort normal and breath sounds normal. No stridor. No respiratory distress. He has no wheezes. He has no rales.   Abdominal: Soft. Bowel sounds are normal.   Musculoskeletal: Normal range of motion. He exhibits no edema.   Lymphadenopathy:     He has no cervical adenopathy.   Neurological: He is alert and oriented to person, place, and time.   Skin: Skin is warm, dry and intact. Capillary refill takes less than 2 seconds. Lesion (left side of face) noted. He is not diaphoretic.   Psychiatric: He has a normal mood and affect. He expresses no suicidal plans.      Assessment:       1. Cough        Plan:       Cough   Likely related to thickened saliva from radiation therapy.  Continue Mucinex bid; chest x-ray if worsens or if develops fever; understanding voiced.  -     X-Ray Chest PA And Lateral; Future; Expected date: 12/04/2018    Other orders  -     guaiFENesin (MUCINEX) 1,200 mg Ta12; Take 1 tablet by mouth 2 (two) times daily. for 10 days  Dispense: 60 tablet; Refill: prn    Daughter was inquiring " about restarting home health.  Pt appears stable today and vitals are stable; recommend reassessment for home health if fatigue and weakness worsen.    Follow-up if symptoms worsen or fail to improve.      12/4/2018 MONA Giron, FNP

## 2018-12-04 NOTE — PATIENT INSTRUCTIONS
Nutrition and MyPlate: Protein Foods    This group includes foods that are high in protein. Protein helps the body build new cells and keeps tissues healthy. Most Americans get enough protein without even trying. It can be harder for vegetarians, but plenty of non-meat foods are rich in protein, too. Its best to get protein from a variety of sources.  Nutrient-rich choices  There's a lot more to this food group than just meat and beans. It also includes nuts, seeds, and eggs. There are all sorts of nutrient-rich choices:  · Chicken and turkey with the skin removed  · Fish and shellfish  · Lean beef, pork, or lamb (without visible fat)  · Soy products, such as tofu, soybeans (edamame), tempeh, or soymilk  · Black beans, kidney beans, byrnes beans, chickpeas (garbanzo beans), and lentils (Note: beans and peas count as both a protein and a vegetable)  · Peanuts, almonds, walnuts, sesame seeds, and sunflower seeds, as well as foods made from these (such as peanut butter or tahini)  · Eggs and foods made with eggs (such as quiche or frittata)  What makes meat and beans less healthy?  · Fatty meat is not healthy. Before you cook meat, trim off all the fat you can see. Chicken and turkey skin is also high in fat, and should be removed before cooking.  · Breading and frying make food less healthy. This includes dishes like fried chicken, fried fish, and refried beans.  · Sausage and lunch meats tend to be high in fat and salt. Buy low-fat, low-sodium versions.  One small change  Make a meal that includes a non-meat source of protein (such as tofu, lentils, or any other food listed above). Have a better idea? Write it here:  _____________________________________________________________  Date Last Reviewed: 6/25/2015  © 7405-5015 [employer]. [address] [place] [address], EMIGDIO Ortiz [address]. All rights reserved. This information is not intended as a substitute for professional medical care. Always follow your  healthcare professional's instructions.

## 2018-12-26 ENCOUNTER — TELEPHONE (OUTPATIENT)
Dept: OTOLARYNGOLOGY | Facility: CLINIC | Age: 83
End: 2018-12-26

## 2018-12-26 NOTE — TELEPHONE ENCOUNTER
----- Message from Leah Rosenberg sent at 12/26/2018 11:39 AM CST -----  Contact: Esther with Dr. Novoa Office # [phone]  Nurse called [facility] incorrectly,    Esther with Dr. Novoa office is requesting pt Radiation treatment Report and any clinical notes relating to visit on 9-11-18, Please fax..    Esther call back # [phone] Fax #[phone]    Thanks

## 2019-01-02 ENCOUNTER — TELEPHONE (OUTPATIENT)
Dept: FAMILY MEDICINE | Facility: CLINIC | Age: 84
End: 2019-01-02

## 2019-01-02 DIAGNOSIS — R06.6 INTRACTABLE HICCUPS: Primary | ICD-10-CM

## 2019-01-02 RX ORDER — PROCHLORPERAZINE MALEATE 10 MG
10 TABLET ORAL 4 TIMES DAILY
Qty: 8 TABLET | Refills: 0 | Status: SHIPPED | OUTPATIENT
Start: 2019-01-02 | End: 2019-01-04

## 2019-01-02 NOTE — TELEPHONE ENCOUNTER
Pickups longer than 48 hours in duration needs evaluation please have patient return to clinic will send in Compazine for today.

## 2019-01-04 ENCOUNTER — TELEPHONE (OUTPATIENT)
Dept: FAMILY MEDICINE | Facility: CLINIC | Age: 84
End: 2019-01-04

## 2019-01-04 NOTE — TELEPHONE ENCOUNTER
Mrs. Martinez called stating that the compazine is not helping with the hic-ups and patient does not want to come in to see you. What else can be done for Mr Giordano?

## 2019-01-07 ENCOUNTER — OFFICE VISIT (OUTPATIENT)
Dept: FAMILY MEDICINE | Facility: CLINIC | Age: 84
End: 2019-01-07
Payer: MEDICARE

## 2019-01-07 VITALS
WEIGHT: 202 LBS | DIASTOLIC BLOOD PRESSURE: 62 MMHG | HEART RATE: 109 BPM | BODY MASS INDEX: 26.77 KG/M2 | TEMPERATURE: 98 F | HEIGHT: 73 IN | SYSTOLIC BLOOD PRESSURE: 138 MMHG | OXYGEN SATURATION: 95 %

## 2019-01-07 DIAGNOSIS — R26.9 ABNORMALITY OF GAIT AND MOBILITY: ICD-10-CM

## 2019-01-07 DIAGNOSIS — T17.908A: ICD-10-CM

## 2019-01-07 DIAGNOSIS — R06.6 INTRACTABLE HICCUPS: ICD-10-CM

## 2019-01-07 DIAGNOSIS — L89.42 PRESSURE INJURY OF CONTIGUOUS REGION INVOLVING BACK AND BUTTOCK, STAGE 2, UNSPECIFIED LATERALITY: ICD-10-CM

## 2019-01-07 DIAGNOSIS — K21.9 GASTROESOPHAGEAL REFLUX DISEASE, ESOPHAGITIS PRESENCE NOT SPECIFIED: ICD-10-CM

## 2019-01-07 DIAGNOSIS — M19.90 ARTHRITIS: Primary | ICD-10-CM

## 2019-01-07 DIAGNOSIS — E46 MALNUTRITION, UNSPECIFIED TYPE: ICD-10-CM

## 2019-01-07 PROBLEM — L89.90 DECUBITUS ULCER: Status: ACTIVE | Noted: 2019-01-07

## 2019-01-07 PROBLEM — F03.90 DEMENTIA: Status: RESOLVED | Noted: 2017-12-13 | Resolved: 2019-01-07

## 2019-01-07 PROCEDURE — 99214 OFFICE O/P EST MOD 30 MIN: CPT | Mod: ,,, | Performed by: FAMILY MEDICINE

## 2019-01-07 PROCEDURE — 99214 PR OFFICE/OUTPT VISIT, EST, LEVL IV, 30-39 MIN: ICD-10-PCS | Mod: ,,, | Performed by: FAMILY MEDICINE

## 2019-01-07 RX ORDER — LORAZEPAM 0.5 MG/1
0.5 TABLET ORAL EVERY 12 HOURS PRN
Qty: 5 TABLET | Refills: 0 | Status: SHIPPED | OUTPATIENT
Start: 2019-01-07 | End: 2019-03-13

## 2019-01-07 NOTE — PROGRESS NOTES
Subjective:       Patient ID: Pasquale Rees is a 89 y.o. male.    Chief Complaint: Hiccups      Patient is coming in today for evaluation of hiccups this been going on for a week he has had in the past when he has had a invasive procedure into the esophagus. 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 He has had the hiccups for over a week now and had a fall on Friday morning. No LOC or head injury.  He sees Dr. Novak at [facility] for a squamous cell carcinoma of the cheek is getting radiation. His family reports that his by mouth intake of solids and liquids has declined as has his physical ability and 8 and subjective weight loss as well.        Allergies and Medications:   Review of patient's allergies indicates:   Allergen Reactions    Codeine Nausea And Vomiting     Current Outpatient Medications   Medication Sig Dispense Refill    aspirin (ECOTRIN) 81 MG EC tablet Take 81 mg by mouth once daily.      cholecalciferol, vitamin D3, 1,000 unit capsule Every day      cranberry conc-ascorbic acid 4,200-20 mg Cap Take by mouth.      cyanocobalamin (VITAMIN B-12) 1000 MCG tablet Take 100 mcg by mouth once daily.      diazePAM (VALIUM) 2 MG tablet   5    diltiaZEM (CARDIZEM CD) 240 MG 24 hr capsule   0    fish oil-omega-3 fatty acids 300-1,000 mg capsule Take 2 g by mouth once daily.      gabapentin (NEURONTIN) 300 MG capsule       garlic 1 mg Cap Every day      glucosamine-chondroit-vit C-Mn (GLUCOSAMINE CHONDROITIN MAXSTR) 500-400 mg Cap Every day      hydroCHLOROthiazide (HYDRODIURIL) 25 MG tablet Take 1 tablet (25 mg total) by mouth once daily. 90 tablet 3    metoprolol succinate (TOPROL-XL) 50 MG 24 hr tablet Take 1 tablet (50 mg total) by mouth once daily. 90 tablet 3    multivitamin (THERAGRAN) per tablet Take 1  tablet by mouth once daily.      omega-3 acid ethyl esters (LOVAZA) 1 gram capsule Take by mouth.      omega-3 fatty acids 1,000 mg Cap Every day      trospium (SANCTURA) 20 mg Tab tablet Take 1 tablet (20 mg total) by mouth 2 (two) times daily. 60 tablet 3    vitamin A-vit C-vit E-zinc-Cu Tab Take by mouth.      ZINC SULFATE (ZINC-15 ORAL) Take by mouth.      LORazepam (ATIVAN) 0.5 MG tablet Take 1 tablet (0.5 mg total) by mouth every 12 (twelve) hours as needed for Anxiety. 5 tablet 0     No current facility-administered medications for this visit.        Family History:   History reviewed. No pertinent family history.    Social History:   Social History     Socioeconomic History    Marital status:      Spouse name: Not on file    Number of children: Not on file    Years of education: Not on file    Highest education level: Not on file   Social Needs    Financial resource strain: Not on file    Food insecurity - worry: Not on file    Food insecurity - inability: Not on file    Transportation needs - medical: Not on file    Transportation needs - non-medical: Not on file   Occupational History    Not on file   Tobacco Use    Smoking status: Never Smoker    Smokeless tobacco: Never Used   Substance and Sexual Activity    Alcohol use: No     Comment: occ    Drug use: No    Sexual activity: Not on file   Other Topics Concern    Not on file   Social History Narrative    ** Merged History Encounter **            Review of Systems   All other systems reviewed and are negative.      Objective:     Vitals:    01/07/19 1056   BP: 138/62   Pulse: 109   Temp: 98.1 °F (36.7 °C)        Physical Exam   Constitutional: He appears well-developed and well-nourished. No distress.   HENT:   Head: Normocephalic and atraumatic.   Eyes: Conjunctivae and EOM are normal. Pupils are equal, round, and reactive to light. Right eye exhibits no discharge. Left eye exhibits no discharge. No scleral icterus.    Cardiovascular: Normal rate, regular rhythm, normal heart sounds and intact distal pulses. Exam reveals no gallop and no friction rub.   No murmur heard.  Pulmonary/Chest: Effort normal and breath sounds normal. No stridor. No respiratory distress. He has no wheezes. He has no rales. He exhibits no tenderness.   Genitourinary: Rectal exam shows guaiac negative stool. No penile tenderness.   Musculoskeletal:   Patient has significant restricted mobility full assist with the transfer. He has a stage II breakdown in the presacral both buttock cheeks. Marked crepitance to both arms   Skin: He is not diaphoretic.       Assessment:       1. Arthritis    2. Gastroesophageal reflux disease, esophagitis presence not specified    3. Pressure injury of contiguous region involving back and buttock, stage 2, unspecified laterality    4. Abnormality of gait and mobility    5. Malnutrition, unspecified type    6. Aspiration, chronic pulmonary, initial encounter    7. Intractable hiccups        Plan:       Pasquale was seen today for hiccups.    Diagnoses and all orders for this visit:    Arthritis  -     Ambulatory referral to Home Health    Gastroesophageal reflux disease, esophagitis presence not specified  -     Ambulatory referral to Home Health    Pressure injury of contiguous region involving back and buttock, stage 2, unspecified laterality  -     Ambulatory referral to Home Health    Abnormality of gait and mobility  -     Ambulatory referral to Home Health    Malnutrition, unspecified type  -     Ambulatory referral to Home Health  -     Comprehensive metabolic panel; Future  -     CBC auto differential; Future  -     TSH; Future    Aspiration, chronic pulmonary, initial encounter  -     Ambulatory referral to Home Health  -     X-Ray Chest PA And Lateral; Future    Intractable hiccups  -     LORazepam (ATIVAN) 0.5 MG tablet; Take 1 tablet (0.5 mg total) by mouth every 12 (twelve) hours as needed for Anxiety.         No  Follow-up on file.

## 2019-01-08 LAB
ALBUMIN SERPL-MCNC: 2.7 G/DL (ref 3.1–4.7)
ALP SERPL-CCNC: 81 IU/L (ref 40–104)
ALT (SGPT): 18 IU/L (ref 3–33)
AST SERPL-CCNC: 23 IU/L (ref 10–40)
BASOPHILS NFR BLD: 0 K/UL (ref 0–0.2)
BASOPHILS NFR BLD: 0.2 %
BILIRUB SERPL-MCNC: 0.7 MG/DL (ref 0.3–1)
BUN SERPL-MCNC: 21 MG/DL (ref 8–20)
CALCIUM SERPL-MCNC: 9.1 MG/DL (ref 7.7–10.4)
CHLORIDE: 92 MMOL/L (ref 98–110)
CO2 SERPL-SCNC: 33.5 MMOL/L (ref 22.8–31.6)
CREATININE: 0.95 MG/DL (ref 0.6–1.4)
EOSINOPHIL NFR BLD: 0.2 K/UL (ref 0–0.7)
EOSINOPHIL NFR BLD: 0.9 %
ERYTHROCYTE [DISTWIDTH] IN BLOOD BY AUTOMATED COUNT: 14.3 % (ref 11.7–14.9)
GLUCOSE: 111 MG/DL (ref 70–99)
GRAN #: 13.3 K/UL (ref 1.4–6.5)
GRAN%: 83.2 %
HCT VFR BLD AUTO: 32 % (ref 39–55)
HGB BLD-MCNC: 10 G/DL (ref 14–16)
IMMATURE GRANS (ABS): 0.1 K/UL (ref 0–1)
IMMATURE GRANULOCYTES: 0.6 %
LYMPH #: 1.4 K/UL (ref 1.2–3.4)
LYMPH%: 8.7 %
MCH RBC QN AUTO: 27.3 PG (ref 25–35)
MCHC RBC AUTO-ENTMCNC: 31.3 G/DL (ref 31–36)
MCV RBC AUTO: 87.4 FL (ref 80–100)
MONO #: 1 K/UL (ref 0.1–0.6)
MONO%: 6.4 %
NUCLEATED RBCS: 0 %
PLATELET # BLD AUTO: 285 K/UL (ref 140–440)
PMV BLD AUTO: 8.8 FL (ref 8.8–12.7)
POTASSIUM SERPL-SCNC: 3.1 MMOL/L (ref 3.5–5)
PROT SERPL-MCNC: 7.8 G/DL (ref 6–8.2)
RBC # BLD AUTO: 3.66 M/UL (ref 4.3–5.9)
SODIUM: 137 MMOL/L (ref 134–144)
TSH SERPL DL<=0.005 MIU/L-ACNC: 1.43 ULU/ML (ref 0.3–5.6)
WBC # BLD AUTO: 16 K/UL (ref 5–10)

## 2019-01-09 ENCOUNTER — TELEPHONE (OUTPATIENT)
Dept: FAMILY MEDICINE | Facility: CLINIC | Age: 84
End: 2019-01-09

## 2019-01-09 DIAGNOSIS — E87.6 HYPOKALEMIA: Primary | ICD-10-CM

## 2019-01-09 RX ORDER — POTASSIUM CHLORIDE 750 MG/1
10 TABLET, EXTENDED RELEASE ORAL DAILY
Qty: 30 TABLET | Refills: 1 | Status: SHIPPED | OUTPATIENT
Start: 2019-01-09 | End: 2019-01-11

## 2019-01-09 NOTE — TELEPHONE ENCOUNTER
Lab results are compatible with malnutrition and mild dehydration needs a potassium supplement and increase water intake. Will send in a potassium replacement and recheck in 2 weeks

## 2019-01-11 ENCOUNTER — TELEPHONE (OUTPATIENT)
Dept: FAMILY MEDICINE | Facility: CLINIC | Age: 84
End: 2019-01-11

## 2019-01-11 DIAGNOSIS — E87.6 HYPOKALEMIA: Primary | ICD-10-CM

## 2019-01-11 RX ORDER — POTASSIUM CHLORIDE 1.5 G/1.58G
20 POWDER, FOR SOLUTION ORAL DAILY
Qty: 30 PACKET | Refills: 5 | Status: SHIPPED | OUTPATIENT
Start: 2019-01-11 | End: 2019-03-13

## 2019-01-18 RX ORDER — HYDROCHLOROTHIAZIDE 25 MG/1
TABLET ORAL
Qty: 90 TABLET | Refills: 3 | OUTPATIENT
Start: 2019-01-18

## 2019-01-18 RX ORDER — HYDROCHLOROTHIAZIDE 25 MG/1
TABLET ORAL
Qty: 90 TABLET | Refills: 3 | Status: SHIPPED | OUTPATIENT
Start: 2019-01-18 | End: 2020-01-07

## 2019-01-22 ENCOUNTER — TELEPHONE (OUTPATIENT)
Dept: FAMILY MEDICINE | Facility: CLINIC | Age: 84
End: 2019-01-22

## 2019-01-22 LAB — POTASSIUM SERPL-SCNC: 4 MMOL/L (ref 3.5–5)

## 2019-01-22 NOTE — TELEPHONE ENCOUNTER
Patient home health nurse called.  She is in the home for lab draw.  MD changed the K+ dosage to 20meq which was picked up by the family and never started.  Last K+ level was 3.1 on 1/9/19.  He is currently still taking 10meq.  Please clarify with new lab results which should be available tomorrow.

## 2019-03-04 ENCOUNTER — TELEPHONE (OUTPATIENT)
Dept: FAMILY MEDICINE | Facility: CLINIC | Age: 84
End: 2019-03-04

## 2019-03-04 NOTE — TELEPHONE ENCOUNTER
Patient was discharged from [facility] on 03/01/2019. Please schedule f/u ov with patient by 03/15/2019. We need to document discharged medicines with current medicine list and bill the 1111F code on claim. Patient needs to be scheduled within 14 days of discharge from hospital.    *YOU CAN SCHEDULE HOSPITAL F/U WITH A NURSE PRACTITIONER    Please send me back date of scheduled appointment. Thank you!

## 2019-03-07 ENCOUNTER — TELEPHONE (OUTPATIENT)
Dept: FAMILY MEDICINE | Facility: CLINIC | Age: 84
End: 2019-03-07

## 2019-03-07 NOTE — TELEPHONE ENCOUNTER
2ND SEND    Patient was discharged from [facility] on 03/01/2019. Please schedule f/u ov with patient by 03/15/2019. We need to document discharged medicines with current medicine list and bill the 1111F code on claim. Patient needs to be scheduled within 14 days of discharge from hospital.    *YOU CAN SCHEDULE HOSPITAL F/U WITH A NURSE PRACTITIONER    Please send me back date of scheduled appointment. Thank you!

## 2019-03-12 ENCOUNTER — LAB VISIT (OUTPATIENT)
Dept: LAB | Facility: HOSPITAL | Age: 84
End: 2019-03-12
Attending: RADIOLOGY
Payer: MEDICARE

## 2019-03-12 DIAGNOSIS — C44.329 SQUAMOUS CELL CARCINOMA OF CHIN: Primary | ICD-10-CM

## 2019-03-12 DIAGNOSIS — Z87.891 PERSONAL HISTORY OF TOBACCO USE, PRESENTING HAZARDS TO HEALTH: ICD-10-CM

## 2019-03-12 LAB
BUN SERPL-MCNC: 14 MG/DL
CREAT SERPL-MCNC: 0.8 MG/DL
EST. GFR  (AFRICAN AMERICAN): >60 ML/MIN/1.73 M^2
EST. GFR  (NON AFRICAN AMERICAN): >60 ML/MIN/1.73 M^2

## 2019-03-12 PROCEDURE — 82565 ASSAY OF CREATININE: CPT

## 2019-03-12 PROCEDURE — 82565 ASSAY OF CREATININE: CPT | Mod: PO

## 2019-03-12 PROCEDURE — 84520 ASSAY OF UREA NITROGEN: CPT

## 2019-03-13 ENCOUNTER — OFFICE VISIT (OUTPATIENT)
Dept: FAMILY MEDICINE | Facility: CLINIC | Age: 84
End: 2019-03-13
Payer: MEDICARE

## 2019-03-13 VITALS
HEIGHT: 73 IN | BODY MASS INDEX: 26.51 KG/M2 | TEMPERATURE: 98 F | OXYGEN SATURATION: 95 % | DIASTOLIC BLOOD PRESSURE: 56 MMHG | HEART RATE: 66 BPM | SYSTOLIC BLOOD PRESSURE: 100 MMHG | WEIGHT: 200 LBS

## 2019-03-13 DIAGNOSIS — L57.0 AK (ACTINIC KERATOSIS): ICD-10-CM

## 2019-03-13 DIAGNOSIS — K83.1 OBSTRUCTIVE JAUNDICE: ICD-10-CM

## 2019-03-13 DIAGNOSIS — E86.0 DEHYDRATION: ICD-10-CM

## 2019-03-13 PROCEDURE — 99213 OFFICE O/P EST LOW 20 MIN: CPT | Mod: ,,, | Performed by: FAMILY MEDICINE

## 2019-03-13 PROCEDURE — 99213 PR OFFICE/OUTPT VISIT, EST, LEVL III, 20-29 MIN: ICD-10-PCS | Mod: ,,, | Performed by: FAMILY MEDICINE

## 2019-03-13 RX ORDER — PANTOPRAZOLE SODIUM 40 MG/1
40 TABLET, DELAYED RELEASE ORAL DAILY
COMMUNITY

## 2019-03-13 RX ORDER — ACETAMINOPHEN, DIPHENHYDRAMINE HCL, PHENYLEPHRINE HCL 325; 25; 5 MG/1; MG/1; MG/1
TABLET ORAL
COMMUNITY

## 2019-03-13 NOTE — PROGRESS NOTES
Issues he has a hospital follow-up he was admitted on 02/27/2019 with a sending cholangitis and a Enterobacter cloaca sepsis the also has required an indwelling catheter placement because of prostatism and urinary retention this was taken out several days after discharge he was discharged on 31 and is here for hospital follow-up today.  As a follow-up, he also had some aspiration pneumonitis in the past of radiation-induced esophageal symptoms and achalasia but his appetite has improved, has done not swallowing training and is improved in that regard.  Wt Readings from Last 3 Encounters:   03/13/19 90.7 kg (200 lb 0 oz)   01/07/19 91.6 kg (202 lb)   12/04/18 95 kg (209 lb 8 oz)   Family reports his appetite is good and he is getting an adequate calories continues to be followed by home health.
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
## 2019-03-25 RX ORDER — TROSPIUM CHLORIDE 20 MG/1
TABLET, FILM COATED ORAL
Qty: 60 TABLET | Refills: 3 | Status: SHIPPED | OUTPATIENT
Start: 2019-03-25 | End: 2019-08-20 | Stop reason: SDUPTHER

## 2019-04-15 PROBLEM — C44.92: Status: ACTIVE | Noted: 2019-04-15

## 2019-04-16 ENCOUNTER — DOCUMENTATION ONLY (OUTPATIENT)
Dept: INFUSION THERAPY | Facility: HOSPITAL | Age: 84
End: 2019-04-16

## 2019-04-16 NOTE — PROGRESS NOTES
Pharmacy Treatment Plan Review    Patient  Pasquale Rees, 89 y.o.  9/5/1925    Indication: Cutaneous Squamous Cell Carcinoma     History:  -Prior Moh's surgery with recurrence  -Prior treatment with radiation  -Per Dr. Simms Patient is not a candidate for surgery.    Labs:  CBC  Lab Results   Component Value Date    WBC 16.0 (H) 01/08/2019    HGB 10.0 (L) 01/08/2019    HCT 32.0 (L) 01/08/2019    MCV 87.4 01/08/2019     01/08/2019       BMP  Lab Results   Component Value Date     01/08/2019    K 4.0 01/22/2019    CL 92 (L) 01/08/2019    CO2 33.5 (H) 01/08/2019    BUN 14 03/12/2019    CREATININE 0.8 03/12/2019    CALCIUM 9.1 01/08/2019    ESTGFRAFRICA >60.0 03/12/2019    EGFRNONAA >60.0 03/12/2019       LFTs  Lab Results   Component Value Date    AST 23 01/08/2019    ALKPHOS 81 01/08/2019    BILITOT 0.7 01/08/2019       The patient is scheduled to start the following infusion:   OP LIBTAYO (CEMIPLIMAB) 350 MG Q3W    21-day cycle, maintenance:  Chemotherapy:   -Libtayo (cemiplimab-rwlc) 350 mg in sodium chloride 0.9% 100 mL chemo infusion, , Intravenous, on day 1, over 30 mins      Premeds  -Ondansetron 8mg IV     The treatment plan matches NCCN template

## 2019-04-23 ENCOUNTER — LAB VISIT (OUTPATIENT)
Dept: LAB | Facility: HOSPITAL | Age: 84
End: 2019-04-23
Attending: INTERNAL MEDICINE
Payer: MEDICARE

## 2019-04-23 DIAGNOSIS — E07.9 THYROID DYSFUNCTION: ICD-10-CM

## 2019-04-23 DIAGNOSIS — C44.92 SQUAMOUS CELL CARCINOMA WITH CUTANEOUS HORN FORMATION: ICD-10-CM

## 2019-04-23 LAB
ALBUMIN SERPL BCP-MCNC: 3.7 G/DL (ref 3.5–5.2)
ALP SERPL-CCNC: 119 U/L (ref 38–145)
ALT SERPL W/O P-5'-P-CCNC: 9 U/L (ref 10–44)
ANION GAP SERPL CALC-SCNC: 4 MMOL/L (ref 8–16)
AST SERPL-CCNC: 29 U/L (ref 17–59)
BASOPHILS # BLD AUTO: 0.05 K/UL (ref 0–0.2)
BASOPHILS NFR BLD: 0.4 % (ref 0–1.9)
BILIRUB SERPL-MCNC: 0.3 MG/DL (ref 0.2–1.3)
BUN SERPL-MCNC: 21 MG/DL (ref 9–21)
CALCIUM SERPL-MCNC: 9.1 MG/DL (ref 8.4–10.2)
CHLORIDE SERPL-SCNC: 102 MMOL/L (ref 95–110)
CO2 SERPL-SCNC: 34 MMOL/L (ref 22–31)
CREAT SERPL-MCNC: 1.01 MG/DL (ref 0.5–1.4)
DIFFERENTIAL METHOD: ABNORMAL
EOSINOPHIL # BLD AUTO: 0.3 K/UL (ref 0–0.5)
EOSINOPHIL NFR BLD: 2.6 % (ref 0–8)
ERYTHROCYTE [DISTWIDTH] IN BLOOD BY AUTOMATED COUNT: 15.9 % (ref 11.5–14.5)
EST. GFR  (AFRICAN AMERICAN): >60 ML/MIN/1.73 M^2
EST. GFR  (NON AFRICAN AMERICAN): >60 ML/MIN/1.73 M^2
GLUCOSE SERPL-MCNC: 116 MG/DL (ref 70–110)
HCT VFR BLD AUTO: 36.1 % (ref 40–54)
HGB BLD-MCNC: 11.6 G/DL (ref 14–18)
IMM GRANULOCYTES # BLD AUTO: 0.04 K/UL (ref 0–0.04)
IMM GRANULOCYTES NFR BLD AUTO: 0.3 % (ref 0–0.5)
LYMPHOCYTES # BLD AUTO: 1.8 K/UL (ref 1–4.8)
LYMPHOCYTES NFR BLD: 14.9 % (ref 18–48)
MCH RBC QN AUTO: 28 PG (ref 27–31)
MCHC RBC AUTO-ENTMCNC: 32.1 G/DL (ref 32–36)
MCV RBC AUTO: 87 FL (ref 82–98)
MONOCYTES # BLD AUTO: 0.9 K/UL (ref 0.3–1)
MONOCYTES NFR BLD: 7.9 % (ref 4–15)
NEUTROPHILS # BLD AUTO: 8.7 K/UL (ref 1.8–7.7)
NEUTROPHILS NFR BLD: 73.9 % (ref 38–73)
NRBC BLD-RTO: 0 /100 WBC
PLATELET # BLD AUTO: 231 K/UL (ref 150–350)
PMV BLD AUTO: 8.5 FL (ref 9.2–12.9)
POTASSIUM SERPL-SCNC: 4 MMOL/L (ref 3.5–5.1)
PROT SERPL-MCNC: 7.4 G/DL (ref 6–8.4)
RBC # BLD AUTO: 4.14 M/UL (ref 4.6–6.2)
SODIUM SERPL-SCNC: 140 MMOL/L (ref 136–145)
TSH SERPL DL<=0.005 MIU/L-ACNC: 1.48 UIU/ML (ref 0.4–4)
WBC # BLD AUTO: 11.76 K/UL (ref 3.9–12.7)

## 2019-04-23 PROCEDURE — 84443 ASSAY THYROID STIM HORMONE: CPT

## 2019-04-23 PROCEDURE — 36415 COLL VENOUS BLD VENIPUNCTURE: CPT | Mod: PN

## 2019-04-23 PROCEDURE — 84443 ASSAY THYROID STIM HORMONE: CPT | Mod: PN

## 2019-04-23 PROCEDURE — 85025 COMPLETE CBC W/AUTO DIFF WBC: CPT | Mod: PN

## 2019-04-23 PROCEDURE — 80053 COMPREHEN METABOLIC PANEL: CPT | Mod: PN

## 2019-04-23 PROCEDURE — 85025 COMPLETE CBC W/AUTO DIFF WBC: CPT

## 2019-04-23 PROCEDURE — 80053 COMPREHEN METABOLIC PANEL: CPT

## 2019-05-02 ENCOUNTER — INFUSION (OUTPATIENT)
Dept: INFUSION THERAPY | Facility: HOSPITAL | Age: 84
End: 2019-05-02
Attending: INTERNAL MEDICINE
Payer: MEDICARE

## 2019-05-02 VITALS
TEMPERATURE: 98 F | RESPIRATION RATE: 18 BRPM | HEART RATE: 79 BPM | WEIGHT: 188.25 LBS | SYSTOLIC BLOOD PRESSURE: 176 MMHG | DIASTOLIC BLOOD PRESSURE: 77 MMHG | OXYGEN SATURATION: 95 % | BODY MASS INDEX: 25.53 KG/M2

## 2019-05-02 VITALS
WEIGHT: 188.25 LBS | HEART RATE: 69 BPM | HEIGHT: 72 IN | BODY MASS INDEX: 25.5 KG/M2 | SYSTOLIC BLOOD PRESSURE: 154 MMHG | DIASTOLIC BLOOD PRESSURE: 75 MMHG

## 2019-05-02 DIAGNOSIS — C44.92 SQUAMOUS CELL CARCINOMA WITH CUTANEOUS HORN FORMATION: Primary | ICD-10-CM

## 2019-05-02 PROCEDURE — 96413 CHEMO IV INFUSION 1 HR: CPT | Mod: PN

## 2019-05-02 PROCEDURE — C9044 INJECTION, CEMIPLIMAB-RWLC: HCPCS | Mod: JG,PN | Performed by: INTERNAL MEDICINE

## 2019-05-02 PROCEDURE — 96375 TX/PRO/DX INJ NEW DRUG ADDON: CPT | Mod: PN

## 2019-05-02 PROCEDURE — 63600175 PHARM REV CODE 636 W HCPCS: Mod: PN | Performed by: INTERNAL MEDICINE

## 2019-05-02 PROCEDURE — 25000003 PHARM REV CODE 250: Mod: PN | Performed by: INTERNAL MEDICINE

## 2019-05-02 RX ORDER — ONDANSETRON 2 MG/ML
8 INJECTION INTRAMUSCULAR; INTRAVENOUS ONCE
Status: COMPLETED | OUTPATIENT
Start: 2019-05-02 | End: 2019-05-02

## 2019-05-02 RX ORDER — SODIUM CHLORIDE 0.9 % (FLUSH) 0.9 %
10 SYRINGE (ML) INJECTION
Status: DISCONTINUED | OUTPATIENT
Start: 2019-05-02 | End: 2019-05-02 | Stop reason: HOSPADM

## 2019-05-02 RX ADMIN — CEMIPLIMAB-RWLC: 50 INJECTION INTRAVENOUS at 11:05

## 2019-05-02 RX ADMIN — ONDANSETRON 8 MG: 2 INJECTION INTRAMUSCULAR; INTRAVENOUS at 11:05

## 2019-05-02 RX ADMIN — SODIUM CHLORIDE: 9 INJECTION, SOLUTION INTRAVENOUS at 10:05

## 2019-05-02 NOTE — PLAN OF CARE
Problem: Adult Inpatient Plan of Care  Goal: Plan of Care Review  Outcome: Ongoing (interventions implemented as appropriate)  Plan of care reviewed with patient; patient verbalizes understanding. Medications reviewed and administered as ordered. VSS.    05/02/19 8294   Plan of Care Review   Plan of Care Reviewed With patient;caregiver;daughter

## 2019-05-23 ENCOUNTER — INFUSION (OUTPATIENT)
Dept: INFUSION THERAPY | Facility: HOSPITAL | Age: 84
End: 2019-05-23
Attending: INTERNAL MEDICINE
Payer: MEDICARE

## 2019-05-23 VITALS
RESPIRATION RATE: 16 BRPM | WEIGHT: 191 LBS | TEMPERATURE: 97 F | DIASTOLIC BLOOD PRESSURE: 69 MMHG | BODY MASS INDEX: 25.87 KG/M2 | HEART RATE: 66 BPM | HEIGHT: 72 IN | SYSTOLIC BLOOD PRESSURE: 129 MMHG

## 2019-05-23 DIAGNOSIS — C44.92 SQUAMOUS CELL CARCINOMA WITH CUTANEOUS HORN FORMATION: Primary | ICD-10-CM

## 2019-05-23 PROCEDURE — 96375 TX/PRO/DX INJ NEW DRUG ADDON: CPT | Mod: PN

## 2019-05-23 PROCEDURE — 63600175 PHARM REV CODE 636 W HCPCS: Mod: JG,PN | Performed by: NURSE PRACTITIONER

## 2019-05-23 PROCEDURE — C9044 INJECTION, CEMIPLIMAB-RWLC: HCPCS | Mod: JG,PN | Performed by: NURSE PRACTITIONER

## 2019-05-23 PROCEDURE — 25000003 PHARM REV CODE 250: Mod: PN | Performed by: NURSE PRACTITIONER

## 2019-05-23 PROCEDURE — 96413 CHEMO IV INFUSION 1 HR: CPT | Mod: PN

## 2019-05-23 RX ORDER — FAMOTIDINE 10 MG/ML
20 INJECTION INTRAVENOUS ONCE AS NEEDED
Status: DISCONTINUED | OUTPATIENT
Start: 2019-05-23 | End: 2019-05-23 | Stop reason: HOSPADM

## 2019-05-23 RX ORDER — DIPHENHYDRAMINE HYDROCHLORIDE 50 MG/ML
50 INJECTION INTRAMUSCULAR; INTRAVENOUS ONCE AS NEEDED
Status: DISCONTINUED | OUTPATIENT
Start: 2019-05-23 | End: 2019-05-23 | Stop reason: HOSPADM

## 2019-05-23 RX ORDER — HEPARIN 100 UNIT/ML
500 SYRINGE INTRAVENOUS
Status: DISCONTINUED | OUTPATIENT
Start: 2019-05-23 | End: 2019-05-23 | Stop reason: HOSPADM

## 2019-05-23 RX ORDER — SODIUM CHLORIDE 0.9 % (FLUSH) 0.9 %
10 SYRINGE (ML) INJECTION
Status: DISCONTINUED | OUTPATIENT
Start: 2019-05-23 | End: 2019-05-23 | Stop reason: HOSPADM

## 2019-05-23 RX ORDER — ONDANSETRON 2 MG/ML
8 INJECTION INTRAMUSCULAR; INTRAVENOUS ONCE
Status: COMPLETED | OUTPATIENT
Start: 2019-05-23 | End: 2019-05-23

## 2019-05-23 RX ORDER — METHYLPREDNISOLONE SOD SUCC 125 MG
125 VIAL (EA) INJECTION ONCE AS NEEDED
Status: DISCONTINUED | OUTPATIENT
Start: 2019-05-23 | End: 2019-05-23 | Stop reason: HOSPADM

## 2019-05-23 RX ADMIN — ONDANSETRON 8 MG: 2 INJECTION INTRAMUSCULAR; INTRAVENOUS at 10:05

## 2019-05-23 RX ADMIN — CEMIPLIMAB-RWLC: 50 INJECTION INTRAVENOUS at 10:05

## 2019-05-23 RX ADMIN — SODIUM CHLORIDE: 9 INJECTION, SOLUTION INTRAVENOUS at 10:05

## 2019-05-23 NOTE — PLAN OF CARE
Problem: Adult Inpatient Plan of Care  Goal: Plan of Care Review  Outcome: Ongoing (interventions implemented as appropriate)  Pt tolerated Libtayo well today; vitals remained stable.  AVS declined, received in MD office this morning.  Pt ambulated independently with walker, daughter available for support    .    Problem: Fatigue (Oncology Care)  Goal: Improved Activity Tolerance    Intervention: Promote Energy Conservation     05/23/19 1320   Promote Airway Secretion Clearance   Activity Management activity adjusted per tolerance;activity encouraged

## 2019-06-13 ENCOUNTER — INFUSION (OUTPATIENT)
Dept: INFUSION THERAPY | Facility: HOSPITAL | Age: 84
End: 2019-06-13
Attending: INTERNAL MEDICINE
Payer: MEDICARE

## 2019-06-13 VITALS
HEIGHT: 72 IN | HEART RATE: 73 BPM | RESPIRATION RATE: 18 BRPM | SYSTOLIC BLOOD PRESSURE: 117 MMHG | BODY MASS INDEX: 25.83 KG/M2 | DIASTOLIC BLOOD PRESSURE: 62 MMHG | WEIGHT: 190.69 LBS

## 2019-06-13 DIAGNOSIS — C44.92 SQUAMOUS CELL CARCINOMA WITH CUTANEOUS HORN FORMATION: Primary | ICD-10-CM

## 2019-06-13 PROCEDURE — 63600175 PHARM REV CODE 636 W HCPCS: Mod: JG,PN | Performed by: NURSE PRACTITIONER

## 2019-06-13 PROCEDURE — 25000003 PHARM REV CODE 250: Mod: PN | Performed by: NURSE PRACTITIONER

## 2019-06-13 PROCEDURE — 96375 TX/PRO/DX INJ NEW DRUG ADDON: CPT | Mod: PN

## 2019-06-13 PROCEDURE — C9044 INJECTION, CEMIPLIMAB-RWLC: HCPCS | Mod: JG,PN | Performed by: NURSE PRACTITIONER

## 2019-06-13 PROCEDURE — 96413 CHEMO IV INFUSION 1 HR: CPT | Mod: PN

## 2019-06-13 RX ORDER — ONDANSETRON 2 MG/ML
8 INJECTION INTRAMUSCULAR; INTRAVENOUS ONCE
Status: COMPLETED | OUTPATIENT
Start: 2019-06-13 | End: 2019-06-13

## 2019-06-13 RX ORDER — SODIUM CHLORIDE 0.9 % (FLUSH) 0.9 %
10 SYRINGE (ML) INJECTION
Status: DISCONTINUED | OUTPATIENT
Start: 2019-06-13 | End: 2019-06-13 | Stop reason: HOSPADM

## 2019-06-13 RX ORDER — FAMOTIDINE 10 MG/ML
20 INJECTION INTRAVENOUS ONCE AS NEEDED
Status: DISCONTINUED | OUTPATIENT
Start: 2019-06-13 | End: 2019-06-13 | Stop reason: HOSPADM

## 2019-06-13 RX ORDER — METHYLPREDNISOLONE SOD SUCC 125 MG
125 VIAL (EA) INJECTION ONCE AS NEEDED
Status: DISCONTINUED | OUTPATIENT
Start: 2019-06-13 | End: 2019-06-13 | Stop reason: HOSPADM

## 2019-06-13 RX ORDER — HEPARIN 100 UNIT/ML
500 SYRINGE INTRAVENOUS
Status: DISCONTINUED | OUTPATIENT
Start: 2019-06-13 | End: 2019-06-13 | Stop reason: HOSPADM

## 2019-06-13 RX ORDER — DIPHENHYDRAMINE HYDROCHLORIDE 50 MG/ML
50 INJECTION INTRAMUSCULAR; INTRAVENOUS ONCE AS NEEDED
Status: DISCONTINUED | OUTPATIENT
Start: 2019-06-13 | End: 2019-06-13 | Stop reason: HOSPADM

## 2019-06-13 RX ADMIN — CEMIPLIMAB-RWLC: 50 INJECTION INTRAVENOUS at 11:06

## 2019-06-13 RX ADMIN — ONDANSETRON 8 MG: 2 INJECTION INTRAMUSCULAR; INTRAVENOUS at 10:06

## 2019-06-13 RX ADMIN — SODIUM CHLORIDE: 9 INJECTION, SOLUTION INTRAVENOUS at 10:06

## 2019-06-24 ENCOUNTER — OFFICE VISIT (OUTPATIENT)
Dept: FAMILY MEDICINE | Facility: CLINIC | Age: 84
End: 2019-06-24
Payer: MEDICARE

## 2019-06-24 VITALS
HEART RATE: 72 BPM | BODY MASS INDEX: 25.06 KG/M2 | SYSTOLIC BLOOD PRESSURE: 124 MMHG | RESPIRATION RATE: 16 BRPM | WEIGHT: 185 LBS | HEIGHT: 72 IN | DIASTOLIC BLOOD PRESSURE: 78 MMHG | OXYGEN SATURATION: 97 % | TEMPERATURE: 98 F

## 2019-06-24 DIAGNOSIS — E78.2 MIXED HYPERLIPIDEMIA: Primary | ICD-10-CM

## 2019-06-24 DIAGNOSIS — Z23 IMMUNIZATION DUE: ICD-10-CM

## 2019-06-24 DIAGNOSIS — I10 ESSENTIAL HYPERTENSION: ICD-10-CM

## 2019-06-24 DIAGNOSIS — Z87.891 SMOKING HISTORY: ICD-10-CM

## 2019-06-24 PROCEDURE — 99213 OFFICE O/P EST LOW 20 MIN: CPT | Mod: ,,, | Performed by: FAMILY MEDICINE

## 2019-06-24 PROCEDURE — 99213 PR OFFICE/OUTPT VISIT, EST, LEVL III, 20-29 MIN: ICD-10-PCS | Mod: ,,, | Performed by: FAMILY MEDICINE

## 2019-06-24 RX ORDER — DIPHENHYDRAMINE HCL 25 MG
25 TABLET ORAL NIGHTLY PRN
COMMUNITY
End: 2019-07-08

## 2019-06-24 RX ORDER — CHOLECALCIFEROL (VITAMIN D3) 25 MCG
TABLET,CHEWABLE ORAL
COMMUNITY
End: 2021-12-23

## 2019-06-24 RX ORDER — OMEPRAZOLE 20 MG/1
CAPSULE, DELAYED RELEASE ORAL
Refills: 99 | COMMUNITY
Start: 2019-03-18 | End: 2021-03-03

## 2019-06-24 RX ORDER — METOPROLOL SUCCINATE 50 MG/1
TABLET, EXTENDED RELEASE ORAL
COMMUNITY
End: 2019-07-08

## 2019-06-24 RX ORDER — DILTIAZEM HYDROCHLORIDE 120 MG/1
TABLET, FILM COATED ORAL
COMMUNITY
End: 2021-03-03

## 2019-06-24 RX ORDER — BACLOFEN 10 MG/1
TABLET ORAL
COMMUNITY
End: 2019-07-08

## 2019-06-24 RX ORDER — DIAZEPAM 2 MG/1
TABLET ORAL
COMMUNITY
End: 2019-07-08

## 2019-06-24 NOTE — PROGRESS NOTES
Subjective:       Patient ID: Pasquale Rees is a 89 y.o. male.    Chief Complaint: Follow-up (3 month follow up Jaundice )      Patient is here to follow-up on shortness of breath from last visit he has been status post radiation for a skin tumor which is regressing nicely he reports he is feeling well as a lot of stamina now arises stationary bike for 30 minutes a day.  Wt Readings from Last 3 Encounters:  06/24/19 : 83.9 kg (185 lb)  06/17/19 : 84.4 kg (186 lb)  06/13/19 : 86.5 kg (190 lb 11.2 oz)          Allergies and Medications:   Review of patient's allergies indicates:   Allergen Reactions    Codeine Nausea And Vomiting     Current Outpatient Medications   Medication Sig Dispense Refill    diphenhydrAMINE (SOMINEX) 25 mg tablet Take 25 mg by mouth nightly as needed for Insomnia.      hydroCHLOROthiazide (HYDRODIURIL) 25 MG tablet TAKE ONE TABLET BY MOUTH ONCE DAILY 90 tablet 3    melatonin 10 mg Tab Take by mouth.      ondansetron (ZOFRAN) 8 MG tablet Take 1 tablet (8 mg total) by mouth every 8 (eight) hours as needed for Nausea. 30 tablet 0    pantoprazole (PROTONIX) 40 MG tablet Take 40 mg by mouth once daily.      triamcinolone acetonide 0.1% (KENALOG) 0.1 % cream Apply topically 3 (three) times daily. 28.4 g 3    trospium (SANCTURA) 20 mg Tab tablet TAKE ONE TABLET BY MOUTH TWO TIMES A DAY 60 tablet 3    vit A/vit C/vit E/zinc/copper (PRESERVISION AREDS ORAL) ICaps AREDS      aspirin (ECOTRIN) 81 MG EC tablet Take 81 mg by mouth once daily.      baclofen (LIORESAL) 10 MG tablet baclofen 10 mg tablet   Take 1 tablet 3 times a day by oral route as directed for 30 days.      cranberry 500 mg Cap       cyanocobalamin (VITAMIN B-12) 1000 MCG tablet Take 100 mcg by mouth once daily.      cyanocobalamin, vitamin B-12, 1,000 mcg Lozg       diazePAM (VALIUM) 2 MG tablet diazepam 2 mg tablet   Take 1 tablet 3 times a day by oral route.      diltiaZEM (CARDIZEM) 120 MG tablet diltiazem 120 mg  tablet   Take 1 tablet every day by oral route.      HYDROcodone-acetaminophen (NORCO) 5-325 mg per tablet Take 1/2 to 1 tab every 4 hours prn for pain 60 tablet 0    metoprolol succinate (TOPROL-XL) 50 MG 24 hr tablet       omeprazole (PRILOSEC) 20 MG capsule   99    pot sorb-maltodex-aloe-manpoly (ORAMAGICRX) Mwsh       tetanus and diphther. tox, PF, (TENIVAC, PF,) 5-2 Lf unit/0.5 mL Syrg Inject 0.5 mLs into the muscle once. for 1 dose 0.5 mL 0    varicella-zoster gE-AS01B, PF, (SHINGRIX, PF,) 50 mcg/0.5 mL injection Inject 0.5 mLs into the muscle once. for 1 dose 0.5 mL 0     No current facility-administered medications for this visit.        Family History:   History reviewed. No pertinent family history.    Social History:   Social History     Socioeconomic History    Marital status:      Spouse name: Not on file    Number of children: Not on file    Years of education: Not on file    Highest education level: Not on file   Occupational History    Not on file   Social Needs    Financial resource strain: Not on file    Food insecurity:     Worry: Not on file     Inability: Not on file    Transportation needs:     Medical: Not on file     Non-medical: Not on file   Tobacco Use    Smoking status: Never Smoker    Smokeless tobacco: Never Used   Substance and Sexual Activity    Alcohol use: No    Drug use: No    Sexual activity: Not on file   Lifestyle    Physical activity:     Days per week: Not on file     Minutes per session: Not on file    Stress: Not on file   Relationships    Social connections:     Talks on phone: Not on file     Gets together: Not on file     Attends Rastafarian service: Not on file     Active member of club or organization: Not on file     Attends meetings of clubs or organizations: Not on file     Relationship status: Not on file   Other Topics Concern    Not on file   Social History Narrative    ** Merged History Encounter **            Review of Systems    Gastrointestinal: Negative for abdominal distention, abdominal pain, anal bleeding, blood in stool, constipation, diarrhea, nausea, rectal pain and vomiting.        Cconstipation res's   Endocrine: Negative for cold intolerance, heat intolerance, polydipsia, polyphagia and polyuria.   Genitourinary: Negative for difficulty urinating and dysuria.       Objective:     Vitals:    06/24/19 1356   BP: 124/78   Pulse: 72   Resp: 16   Temp: 97.8 °F (36.6 °C)        Physical Exam   Constitutional: He appears well-developed and well-nourished.   HENT:   Head: Normocephalic and atraumatic.   Cardiovascular: Normal rate, regular rhythm, normal heart sounds and intact distal pulses. Exam reveals no gallop and no friction rub.   No murmur heard.  Pulmonary/Chest: Effort normal and breath sounds normal. No stridor. No respiratory distress. He has no wheezes. He has no rales. He exhibits no tenderness.       Assessment:       1. Mixed hyperlipidemia    2. Essential hypertension    3. Smoking history    4. Immunization due      his general status has improved significantly including his dementia well-being and his previous presacral ulcers.  Plan:       Pasquale was seen today for follow-up.    Diagnoses and all orders for this visit:    Mixed hyperlipidemia    Essential hypertension    Smoking history  -     US Abdominal Aorta; Future    Immunization due  -     tetanus and diphther. tox, PF, (TENIVAC, PF,) 5-2 Lf unit/0.5 mL Syrg; Inject 0.5 mLs into the muscle once. for 1 dose  -     varicella-zoster gE-AS01B, PF, (SHINGRIX, PF,) 50 mcg/0.5 mL injection; Inject 0.5 mLs into the muscle once. for 1 dose         Follow up in about 6 months (around 12/24/2019).

## 2019-07-03 ENCOUNTER — INFUSION (OUTPATIENT)
Dept: INFUSION THERAPY | Facility: HOSPITAL | Age: 84
End: 2019-07-03
Attending: INTERNAL MEDICINE
Payer: MEDICARE

## 2019-07-03 VITALS
OXYGEN SATURATION: 97 % | BODY MASS INDEX: 25.27 KG/M2 | WEIGHT: 186.31 LBS | SYSTOLIC BLOOD PRESSURE: 117 MMHG | TEMPERATURE: 96 F | HEART RATE: 54 BPM | RESPIRATION RATE: 17 BRPM | DIASTOLIC BLOOD PRESSURE: 69 MMHG

## 2019-07-03 DIAGNOSIS — C44.92 SQUAMOUS CELL CARCINOMA WITH CUTANEOUS HORN FORMATION: Primary | ICD-10-CM

## 2019-07-03 PROCEDURE — C9044 INJECTION, CEMIPLIMAB-RWLC: HCPCS | Mod: JG,PN | Performed by: PHYSICIAN ASSISTANT

## 2019-07-03 PROCEDURE — A4216 STERILE WATER/SALINE, 10 ML: HCPCS | Mod: PN | Performed by: PHYSICIAN ASSISTANT

## 2019-07-03 PROCEDURE — 96376 TX/PRO/DX INJ SAME DRUG ADON: CPT | Mod: PN

## 2019-07-03 PROCEDURE — 63600175 PHARM REV CODE 636 W HCPCS: Mod: JG,PN | Performed by: PHYSICIAN ASSISTANT

## 2019-07-03 PROCEDURE — 96413 CHEMO IV INFUSION 1 HR: CPT | Mod: PN

## 2019-07-03 PROCEDURE — 25000003 PHARM REV CODE 250: Mod: PN | Performed by: PHYSICIAN ASSISTANT

## 2019-07-03 RX ORDER — SODIUM CHLORIDE 0.9 % (FLUSH) 0.9 %
10 SYRINGE (ML) INJECTION
Status: DISCONTINUED | OUTPATIENT
Start: 2019-07-03 | End: 2019-07-03 | Stop reason: HOSPADM

## 2019-07-03 RX ORDER — FAMOTIDINE 10 MG/ML
20 INJECTION INTRAVENOUS ONCE AS NEEDED
Status: DISCONTINUED | OUTPATIENT
Start: 2019-07-03 | End: 2019-07-03 | Stop reason: HOSPADM

## 2019-07-03 RX ORDER — DIPHENHYDRAMINE HYDROCHLORIDE 50 MG/ML
50 INJECTION INTRAMUSCULAR; INTRAVENOUS ONCE AS NEEDED
Status: DISCONTINUED | OUTPATIENT
Start: 2019-07-03 | End: 2019-07-03 | Stop reason: HOSPADM

## 2019-07-03 RX ORDER — HEPARIN 100 UNIT/ML
500 SYRINGE INTRAVENOUS
Status: DISCONTINUED | OUTPATIENT
Start: 2019-07-03 | End: 2019-07-03 | Stop reason: HOSPADM

## 2019-07-03 RX ORDER — ONDANSETRON 2 MG/ML
8 INJECTION INTRAMUSCULAR; INTRAVENOUS ONCE
Status: COMPLETED | OUTPATIENT
Start: 2019-07-03 | End: 2019-07-03

## 2019-07-03 RX ORDER — METHYLPREDNISOLONE SOD SUCC 125 MG
125 VIAL (EA) INJECTION ONCE AS NEEDED
Status: DISCONTINUED | OUTPATIENT
Start: 2019-07-03 | End: 2019-07-03 | Stop reason: HOSPADM

## 2019-07-03 RX ADMIN — ONDANSETRON 8 MG: 2 INJECTION INTRAMUSCULAR; INTRAVENOUS at 10:07

## 2019-07-03 RX ADMIN — CEMIPLIMAB-RWLC: 50 INJECTION INTRAVENOUS at 11:07

## 2019-07-03 RX ADMIN — Medication 10 ML: at 11:07

## 2019-07-03 RX ADMIN — SODIUM CHLORIDE: 9 INJECTION, SOLUTION INTRAVENOUS at 10:07

## 2019-07-25 ENCOUNTER — INFUSION (OUTPATIENT)
Dept: INFUSION THERAPY | Facility: HOSPITAL | Age: 84
End: 2019-07-25
Attending: INTERNAL MEDICINE
Payer: MEDICARE

## 2019-07-25 VITALS — SYSTOLIC BLOOD PRESSURE: 141 MMHG | DIASTOLIC BLOOD PRESSURE: 73 MMHG | HEART RATE: 73 BPM

## 2019-07-25 DIAGNOSIS — C44.92 SQUAMOUS CELL CARCINOMA WITH CUTANEOUS HORN FORMATION: Primary | ICD-10-CM

## 2019-07-25 PROCEDURE — 96375 TX/PRO/DX INJ NEW DRUG ADDON: CPT | Mod: PN

## 2019-07-25 PROCEDURE — 63600175 PHARM REV CODE 636 W HCPCS: Mod: JG,PN | Performed by: NURSE PRACTITIONER

## 2019-07-25 PROCEDURE — C9044 INJECTION, CEMIPLIMAB-RWLC: HCPCS | Mod: JG,PN | Performed by: NURSE PRACTITIONER

## 2019-07-25 PROCEDURE — 96413 CHEMO IV INFUSION 1 HR: CPT | Mod: PN

## 2019-07-25 RX ORDER — ONDANSETRON 2 MG/ML
8 INJECTION INTRAMUSCULAR; INTRAVENOUS ONCE
Status: COMPLETED | OUTPATIENT
Start: 2019-07-25 | End: 2019-07-25

## 2019-07-25 RX ORDER — SODIUM CHLORIDE 0.9 % (FLUSH) 0.9 %
10 SYRINGE (ML) INJECTION
Status: DISCONTINUED | OUTPATIENT
Start: 2019-07-25 | End: 2019-07-25 | Stop reason: HOSPADM

## 2019-07-25 RX ADMIN — ONDANSETRON 8 MG: 2 INJECTION INTRAMUSCULAR; INTRAVENOUS at 10:07

## 2019-07-25 RX ADMIN — SODIUM CHLORIDE: 900 INJECTION, SOLUTION INTRAVENOUS at 10:07

## 2019-07-25 RX ADMIN — CEMIPLIMAB-RWLC: 50 INJECTION INTRAVENOUS at 11:07

## 2019-08-15 ENCOUNTER — INFUSION (OUTPATIENT)
Dept: INFUSION THERAPY | Facility: HOSPITAL | Age: 84
End: 2019-08-15
Attending: INTERNAL MEDICINE
Payer: MEDICARE

## 2019-08-15 VITALS
RESPIRATION RATE: 17 BRPM | HEIGHT: 73 IN | TEMPERATURE: 99 F | WEIGHT: 186 LBS | SYSTOLIC BLOOD PRESSURE: 151 MMHG | HEART RATE: 91 BPM | DIASTOLIC BLOOD PRESSURE: 74 MMHG | BODY MASS INDEX: 24.65 KG/M2

## 2019-08-15 DIAGNOSIS — C44.92 SQUAMOUS CELL CARCINOMA WITH CUTANEOUS HORN FORMATION: Primary | ICD-10-CM

## 2019-08-15 PROCEDURE — 63600175 PHARM REV CODE 636 W HCPCS: Mod: PN | Performed by: PHYSICIAN ASSISTANT

## 2019-08-15 PROCEDURE — 96375 TX/PRO/DX INJ NEW DRUG ADDON: CPT | Mod: PN

## 2019-08-15 PROCEDURE — 25000003 PHARM REV CODE 250: Mod: PN | Performed by: PHYSICIAN ASSISTANT

## 2019-08-15 PROCEDURE — C9044 INJECTION, CEMIPLIMAB-RWLC: HCPCS | Mod: JG,PN | Performed by: PHYSICIAN ASSISTANT

## 2019-08-15 PROCEDURE — 96413 CHEMO IV INFUSION 1 HR: CPT | Mod: PN

## 2019-08-15 PROCEDURE — A4216 STERILE WATER/SALINE, 10 ML: HCPCS | Mod: PN | Performed by: PHYSICIAN ASSISTANT

## 2019-08-15 RX ORDER — SODIUM CHLORIDE 0.9 % (FLUSH) 0.9 %
10 SYRINGE (ML) INJECTION
Status: DISCONTINUED | OUTPATIENT
Start: 2019-08-15 | End: 2019-08-15 | Stop reason: HOSPADM

## 2019-08-15 RX ORDER — ONDANSETRON 2 MG/ML
8 INJECTION INTRAMUSCULAR; INTRAVENOUS ONCE
Status: COMPLETED | OUTPATIENT
Start: 2019-08-15 | End: 2019-08-15

## 2019-08-15 RX ADMIN — ONDANSETRON 8 MG: 2 INJECTION INTRAMUSCULAR; INTRAVENOUS at 10:08

## 2019-08-15 RX ADMIN — CEMIPLIMAB-RWLC: 50 INJECTION INTRAVENOUS at 11:08

## 2019-08-15 RX ADMIN — SODIUM CHLORIDE, PRESERVATIVE FREE 10 ML: 5 INJECTION INTRAVENOUS at 10:08

## 2019-08-15 RX ADMIN — SODIUM CHLORIDE: 9 INJECTION, SOLUTION INTRAVENOUS at 10:08

## 2019-08-15 NOTE — PLAN OF CARE
Problem: Adult Inpatient Plan of Care  Goal: Plan of Care Review  Outcome: Ongoing (interventions implemented as appropriate)  Pt. Completed treatment, tolerated without noted distress.Vtial signs stable. Patient discharged from infusion center using walker, accompanied to bathroom. Care giver waiting to walk with patient to vehicle. Patient had all present questions answered.

## 2019-08-21 RX ORDER — TROSPIUM CHLORIDE 20 MG/1
TABLET, FILM COATED ORAL
Qty: 30 TABLET | Refills: 0 | Status: SHIPPED | OUTPATIENT
Start: 2019-08-21 | End: 2019-11-08 | Stop reason: SDUPTHER

## 2019-09-05 ENCOUNTER — INFUSION (OUTPATIENT)
Dept: INFUSION THERAPY | Facility: HOSPITAL | Age: 84
End: 2019-09-05
Attending: FAMILY MEDICINE
Payer: MEDICARE

## 2019-09-05 VITALS
HEART RATE: 63 BPM | RESPIRATION RATE: 16 BRPM | HEIGHT: 72 IN | SYSTOLIC BLOOD PRESSURE: 106 MMHG | DIASTOLIC BLOOD PRESSURE: 64 MMHG | WEIGHT: 185 LBS | BODY MASS INDEX: 25.06 KG/M2 | TEMPERATURE: 96 F

## 2019-09-05 DIAGNOSIS — C44.92 SQUAMOUS CELL CARCINOMA WITH CUTANEOUS HORN FORMATION: Primary | ICD-10-CM

## 2019-09-05 PROCEDURE — C9044 INJECTION, CEMIPLIMAB-RWLC: HCPCS | Mod: PN | Performed by: NURSE PRACTITIONER

## 2019-09-05 PROCEDURE — 96375 TX/PRO/DX INJ NEW DRUG ADDON: CPT | Mod: PN

## 2019-09-05 PROCEDURE — 96413 CHEMO IV INFUSION 1 HR: CPT | Mod: PN

## 2019-09-05 PROCEDURE — 63600175 PHARM REV CODE 636 W HCPCS: Mod: PN | Performed by: NURSE PRACTITIONER

## 2019-09-05 RX ORDER — SODIUM CHLORIDE 0.9 % (FLUSH) 0.9 %
10 SYRINGE (ML) INJECTION
Status: DISCONTINUED | OUTPATIENT
Start: 2019-09-05 | End: 2019-09-05 | Stop reason: HOSPADM

## 2019-09-05 RX ORDER — ONDANSETRON 2 MG/ML
8 INJECTION INTRAMUSCULAR; INTRAVENOUS ONCE
Status: COMPLETED | OUTPATIENT
Start: 2019-09-05 | End: 2019-09-05

## 2019-09-05 RX ADMIN — ONDANSETRON 8 MG: 2 INJECTION INTRAMUSCULAR; INTRAVENOUS at 11:09

## 2019-09-05 RX ADMIN — SODIUM CHLORIDE: 9 INJECTION, SOLUTION INTRAVENOUS at 11:09

## 2019-09-05 RX ADMIN — CEMIPLIMAB-RWLC: 50 INJECTION INTRAVENOUS at 11:09

## 2019-09-05 NOTE — PLAN OF CARE
Problem: Adult Inpatient Plan of Care  Goal: Plan of Care Review  Outcome: Ongoing (interventions implemented as appropriate)  Plan of care reviewed with patient; patient verbalizes understanding. Medications reviewed and administered as ordered. VSS. Safety precautions maintained.  NADN.          09/05/19 1242   Plan of Care Review   Plan of Care Reviewed With patient

## 2019-09-26 ENCOUNTER — INFUSION (OUTPATIENT)
Dept: INFUSION THERAPY | Facility: HOSPITAL | Age: 84
End: 2019-09-26
Attending: INTERNAL MEDICINE
Payer: MEDICARE

## 2019-09-26 VITALS
TEMPERATURE: 97 F | BODY MASS INDEX: 24.92 KG/M2 | HEIGHT: 73 IN | RESPIRATION RATE: 18 BRPM | HEART RATE: 76 BPM | SYSTOLIC BLOOD PRESSURE: 144 MMHG | WEIGHT: 188 LBS | DIASTOLIC BLOOD PRESSURE: 78 MMHG

## 2019-09-26 DIAGNOSIS — C44.92 SQUAMOUS CELL CARCINOMA WITH CUTANEOUS HORN FORMATION: Primary | ICD-10-CM

## 2019-09-26 PROCEDURE — 96375 TX/PRO/DX INJ NEW DRUG ADDON: CPT | Mod: PN

## 2019-09-26 PROCEDURE — C9044 INJECTION, CEMIPLIMAB-RWLC: HCPCS | Mod: PN | Performed by: NURSE PRACTITIONER

## 2019-09-26 PROCEDURE — 25000003 PHARM REV CODE 250: Mod: PN | Performed by: NURSE PRACTITIONER

## 2019-09-26 PROCEDURE — 63600175 PHARM REV CODE 636 W HCPCS: Mod: PN | Performed by: NURSE PRACTITIONER

## 2019-09-26 PROCEDURE — A4216 STERILE WATER/SALINE, 10 ML: HCPCS | Mod: PN | Performed by: NURSE PRACTITIONER

## 2019-09-26 PROCEDURE — 96413 CHEMO IV INFUSION 1 HR: CPT | Mod: PN

## 2019-09-26 RX ORDER — ONDANSETRON 2 MG/ML
8 INJECTION INTRAMUSCULAR; INTRAVENOUS ONCE
Status: COMPLETED | OUTPATIENT
Start: 2019-09-26 | End: 2019-09-26

## 2019-09-26 RX ORDER — SODIUM CHLORIDE 0.9 % (FLUSH) 0.9 %
10 SYRINGE (ML) INJECTION
Status: DISCONTINUED | OUTPATIENT
Start: 2019-09-26 | End: 2019-09-26 | Stop reason: HOSPADM

## 2019-09-26 RX ORDER — HEPARIN 100 UNIT/ML
500 SYRINGE INTRAVENOUS
Status: DISCONTINUED | OUTPATIENT
Start: 2019-09-26 | End: 2019-09-26 | Stop reason: HOSPADM

## 2019-09-26 RX ADMIN — CEMIPLIMAB-RWLC: 50 INJECTION INTRAVENOUS at 10:09

## 2019-09-26 RX ADMIN — Medication 10 ML: at 11:09

## 2019-09-26 RX ADMIN — ONDANSETRON 8 MG: 2 INJECTION INTRAMUSCULAR; INTRAVENOUS at 10:09

## 2019-09-26 RX ADMIN — SODIUM CHLORIDE: 9 INJECTION, SOLUTION INTRAVENOUS at 10:09

## 2019-09-26 NOTE — PLAN OF CARE
Pt tolerated tx well. No signs of infusion reaction noted. AVS given to pt Pt educated to call Dr with any issues. Pt verbalized understanding. Pt adequate for discharge.

## 2019-10-17 ENCOUNTER — INFUSION (OUTPATIENT)
Dept: INFUSION THERAPY | Facility: HOSPITAL | Age: 84
End: 2019-10-17
Attending: INTERNAL MEDICINE
Payer: MEDICARE

## 2019-10-17 VITALS
DIASTOLIC BLOOD PRESSURE: 74 MMHG | RESPIRATION RATE: 18 BRPM | WEIGHT: 192.13 LBS | SYSTOLIC BLOOD PRESSURE: 143 MMHG | BODY MASS INDEX: 26.02 KG/M2 | TEMPERATURE: 98 F | HEART RATE: 79 BPM | HEIGHT: 72 IN

## 2019-10-17 DIAGNOSIS — C44.92 SQUAMOUS CELL CARCINOMA WITH CUTANEOUS HORN FORMATION: Primary | ICD-10-CM

## 2019-10-17 PROCEDURE — 96375 TX/PRO/DX INJ NEW DRUG ADDON: CPT | Mod: PN

## 2019-10-17 PROCEDURE — 63600175 PHARM REV CODE 636 W HCPCS: Mod: PN | Performed by: NURSE PRACTITIONER

## 2019-10-17 PROCEDURE — 96413 CHEMO IV INFUSION 1 HR: CPT | Mod: PN

## 2019-10-17 RX ORDER — ONDANSETRON 2 MG/ML
8 INJECTION INTRAMUSCULAR; INTRAVENOUS ONCE
Status: COMPLETED | OUTPATIENT
Start: 2019-10-17 | End: 2019-10-17

## 2019-10-17 RX ADMIN — CEMIPLIMAB-RWLC: 50 INJECTION INTRAVENOUS at 01:10

## 2019-10-17 RX ADMIN — ONDANSETRON 8 MG: 2 INJECTION INTRAMUSCULAR; INTRAVENOUS at 11:10

## 2019-10-17 RX ADMIN — SODIUM CHLORIDE: 9 INJECTION, SOLUTION INTRAVENOUS at 11:10

## 2019-10-17 NOTE — PLAN OF CARE
Pt tolerated Libtayo infusion well.  No s/s of infusion reaction noted. Instructed to call MD with any problems.

## 2019-11-07 ENCOUNTER — INFUSION (OUTPATIENT)
Dept: INFUSION THERAPY | Facility: HOSPITAL | Age: 84
End: 2019-11-07
Attending: INTERNAL MEDICINE
Payer: MEDICARE

## 2019-11-07 VITALS
TEMPERATURE: 98 F | HEART RATE: 59 BPM | SYSTOLIC BLOOD PRESSURE: 136 MMHG | DIASTOLIC BLOOD PRESSURE: 65 MMHG | RESPIRATION RATE: 17 BRPM

## 2019-11-07 DIAGNOSIS — C44.92 SQUAMOUS CELL CARCINOMA WITH CUTANEOUS HORN FORMATION: Primary | ICD-10-CM

## 2019-11-07 PROCEDURE — 96413 CHEMO IV INFUSION 1 HR: CPT | Mod: PN

## 2019-11-07 PROCEDURE — 25000003 PHARM REV CODE 250: Mod: PN | Performed by: NURSE PRACTITIONER

## 2019-11-07 PROCEDURE — 96375 TX/PRO/DX INJ NEW DRUG ADDON: CPT | Mod: PN

## 2019-11-07 PROCEDURE — A4216 STERILE WATER/SALINE, 10 ML: HCPCS | Mod: PN | Performed by: NURSE PRACTITIONER

## 2019-11-07 PROCEDURE — 63600175 PHARM REV CODE 636 W HCPCS: Mod: PN | Performed by: NURSE PRACTITIONER

## 2019-11-07 RX ORDER — SODIUM CHLORIDE 0.9 % (FLUSH) 0.9 %
10 SYRINGE (ML) INJECTION
Status: DISCONTINUED | OUTPATIENT
Start: 2019-11-07 | End: 2019-11-07 | Stop reason: HOSPADM

## 2019-11-07 RX ORDER — ONDANSETRON 2 MG/ML
8 INJECTION INTRAMUSCULAR; INTRAVENOUS ONCE
Status: COMPLETED | OUTPATIENT
Start: 2019-11-07 | End: 2019-11-07

## 2019-11-07 RX ADMIN — ONDANSETRON 8 MG: 2 INJECTION INTRAMUSCULAR; INTRAVENOUS at 11:11

## 2019-11-07 RX ADMIN — SODIUM CHLORIDE: 9 INJECTION, SOLUTION INTRAVENOUS at 11:11

## 2019-11-07 RX ADMIN — CEMIPLIMAB-RWLC: 50 INJECTION INTRAVENOUS at 11:11

## 2019-11-07 RX ADMIN — SODIUM CHLORIDE, PRESERVATIVE FREE 10 ML: 5 INJECTION INTRAVENOUS at 11:11

## 2019-11-07 NOTE — PLAN OF CARE
Pt. Completed treatment, tolerated without noted distress.Vtial signs stable. Patient discharged from infusion center ambulatory using walked accompanied by dgtr. Patient had all present questions answered.

## 2019-11-08 DIAGNOSIS — N32.89 BLADDER SPASM: Primary | ICD-10-CM

## 2019-11-08 RX ORDER — TROSPIUM CHLORIDE 20 MG/1
TABLET, FILM COATED ORAL
Qty: 30 TABLET | Refills: 0 | Status: SHIPPED | OUTPATIENT
Start: 2019-11-08 | End: 2019-12-06 | Stop reason: SDUPTHER

## 2019-11-08 NOTE — TELEPHONE ENCOUNTER
Patient requests a refill on his trospium for overactive bladder he is on testosterone replacement and I see no history of any urologic evaluations or workup will get a postvoiding residual urine but refills prescription for now.

## 2019-11-13 ENCOUNTER — HOSPITAL ENCOUNTER (OUTPATIENT)
Dept: RADIOLOGY | Facility: HOSPITAL | Age: 84
Discharge: HOME | End: 2019-11-13
Attending: FAMILY MEDICINE
Payer: MEDICARE

## 2019-11-13 DIAGNOSIS — N32.89 BLADDER SPASM: ICD-10-CM

## 2019-11-13 PROCEDURE — 76857 US EXAM PELVIC LIMITED: CPT | Mod: TC,PO

## 2019-11-14 ENCOUNTER — HOSPITAL ENCOUNTER (EMERGENCY)
Facility: HOSPITAL | Age: 84
Discharge: HOME | End: 2019-11-15
Attending: EMERGENCY MEDICINE
Payer: MEDICARE

## 2019-11-14 ENCOUNTER — TELEPHONE (OUTPATIENT)
Dept: FAMILY MEDICINE | Facility: CLINIC | Age: 84
End: 2019-11-14

## 2019-11-14 DIAGNOSIS — S51.019A LACERATION OF ELBOW: ICD-10-CM

## 2019-11-14 DIAGNOSIS — S41.112A LACERATION OF LEFT UPPER EXTREMITY, INITIAL ENCOUNTER: Primary | ICD-10-CM

## 2019-11-14 PROCEDURE — 99283 EMERGENCY DEPT VISIT LOW MDM: CPT | Mod: 25

## 2019-11-14 PROCEDURE — 12001 RPR S/N/AX/GEN/TRNK 2.5CM/<: CPT | Mod: LT

## 2019-11-14 RX ORDER — LIDOCAINE HYDROCHLORIDE 10 MG/ML
10 INJECTION INFILTRATION; PERINEURAL
Status: DISCONTINUED | OUTPATIENT
Start: 2019-11-14 | End: 2019-11-15 | Stop reason: HOSPADM

## 2019-11-14 NOTE — TELEPHONE ENCOUNTER
----- Message from Alex Guadalupe MD sent at 11/13/2019  4:49 PM CST -----  Results Ok, notify patient.

## 2019-11-15 VITALS
RESPIRATION RATE: 20 BRPM | HEIGHT: 72 IN | DIASTOLIC BLOOD PRESSURE: 75 MMHG | WEIGHT: 195 LBS | BODY MASS INDEX: 26.41 KG/M2 | HEART RATE: 71 BPM | SYSTOLIC BLOOD PRESSURE: 171 MMHG | TEMPERATURE: 98 F | OXYGEN SATURATION: 100 %

## 2019-11-20 NOTE — ED PROVIDER NOTES
Encounter Date: 11/14/2019       History     Chief Complaint   Patient presents with    Fall     pt fell after attempting to get out of his chair resulting in fall hitting left elbow. family reports laceration to left elbow. pressure applied by family prior to EMS arrival.     Laceration     HPI     Seen and evaluated.  Presents with left-sided elbow laceration after fall.  Denies any additional complaints.  Reports no loss of consciousness.  Seven acutely just prior to arrival.  There is moderate to significant bleeding at from the wound. It is now hemostatic.  He reports no additional complaints.      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  Social History     Tobacco Use    Smoking status: Never Smoker    Smokeless tobacco: Never Used   Substance Use Topics    Alcohol use: No    Drug use: No     Review of Systems   Skin: Positive for wound.   All other systems reviewed and are negative.      Physical Exam     Initial Vitals [11/14/19 2049]   BP Pulse Resp Temp SpO2   (!) 178/78 72 20 98.1 °F (36.7 °C) 100 %      MAP       --         Physical Exam    Constitutional: Vital signs are normal. He appears well-developed and well-nourished.   HENT:   Head: Normocephalic and atraumatic.   Eyes: Conjunctivae are normal.   Neck: Neck supple.   Cardiovascular:  "Normal rate and regular rhythm.   Abdominal: Soft. Normal appearance.   Musculoskeletal: Normal range of motion.   Neurological: He is alert and oriented to person, place, and time.   Skin: Skin is warm and dry.   Laceration left arm/elbnow   Psychiatric: He has a normal mood and affect.       2 cm laceraton  ED Course   Lac Repair  Date/Time: 11/20/2019 4:51 PM  Performed by: Jose Alberto Martinez Jr., MD  Authorized by: Jose Alberto Martinez Jr., MD   Time out: Immediately prior to procedure a "time out" was called to verify the correct patient, procedure, equipment, support staff and site/side marked as required.  Body area: upper extremity  Location details: left elbow  Anesthesia: local infiltration    Anesthesia:  Local anesthesia used: yes  Local Anesthetic: lidocaine 1% without epinephrine  Anesthetic total: 5 mL  Preparation: Patient was prepped and draped in the usual sterile fashion.  Skin closure: 4-0 Prolene  Number of sutures: 4  Technique: simple      2 cm laceration  Labs Reviewed - No data to display       Imaging Results          X-Ray Elbow Complete Left (Final result)  Result time 11/15/19 06:26:26    Final result by Jeanne Nagy MD (11/15/19 06:26:26)                 Narrative:    PROCEDURE:    XR ELBOW COMPLETE 3 VIEW LEFT  dated  11/14/2019 11:13  PM    CLINICAL HISTORY:   Male 89 years of age.   pt fell injuring left  elbow    TECHNIQUE:  3 views left elbow    PREVIOUS STUDIES:  None    FINDINGS:    Soft tissue is thickened over the olecranon. There is defect of the  skin and subcutaneous tissue, and foci of air within the thickened  soft tissue. There is no radiopaque foreign body. The bones are  intact. Alignment is normal. There is no elbow joint effusion.    IMPRESSION:    Soft tissue injury without other acute finding.    Electronically Signed by Jeanne Nagy on 11/15/2019 6:39 AM                               Medical Decision Making:   Initial Assessment:   Laceration.  X-ray showed no foreign " body.  No fracture wound repaired with suture.  Discharged to follow up with outpatient.                                 Clinical Impression:       ICD-10-CM ICD-9-CM   1. Laceration of left upper extremity, initial encounter S41.112A 884.0   2. Laceration of elbow S51.019A 881.01                             Jose Alberto Martinez Jr., MD  11/20/19 6022       Jose Alberto Martinez Jr., MD  11/22/19 [id number]

## 2019-11-29 ENCOUNTER — INFUSION (OUTPATIENT)
Dept: INFUSION THERAPY | Facility: HOSPITAL | Age: 84
End: 2019-11-29
Attending: INTERNAL MEDICINE
Payer: MEDICARE

## 2019-11-29 ENCOUNTER — TELEPHONE (OUTPATIENT)
Dept: FAMILY MEDICINE | Facility: CLINIC | Age: 84
End: 2019-11-29

## 2019-11-29 VITALS
SYSTOLIC BLOOD PRESSURE: 123 MMHG | TEMPERATURE: 98 F | HEART RATE: 56 BPM | BODY MASS INDEX: 25.45 KG/M2 | RESPIRATION RATE: 16 BRPM | HEIGHT: 73 IN | WEIGHT: 192 LBS | DIASTOLIC BLOOD PRESSURE: 64 MMHG

## 2019-11-29 DIAGNOSIS — C44.92 SQUAMOUS CELL CARCINOMA WITH CUTANEOUS HORN FORMATION: Primary | ICD-10-CM

## 2019-11-29 PROCEDURE — 96375 TX/PRO/DX INJ NEW DRUG ADDON: CPT | Mod: PN

## 2019-11-29 PROCEDURE — 63600175 PHARM REV CODE 636 W HCPCS: Mod: PN | Performed by: PHYSICIAN ASSISTANT

## 2019-11-29 PROCEDURE — 96367 TX/PROPH/DG ADDL SEQ IV INF: CPT | Mod: PN

## 2019-11-29 PROCEDURE — 96413 CHEMO IV INFUSION 1 HR: CPT | Mod: PN

## 2019-11-29 RX ORDER — ONDANSETRON 2 MG/ML
8 INJECTION INTRAMUSCULAR; INTRAVENOUS ONCE
Status: COMPLETED | OUTPATIENT
Start: 2019-11-29 | End: 2019-11-29

## 2019-11-29 RX ORDER — HEPARIN 100 UNIT/ML
500 SYRINGE INTRAVENOUS
Status: DISCONTINUED | OUTPATIENT
Start: 2019-11-29 | End: 2019-11-29 | Stop reason: HOSPADM

## 2019-11-29 RX ORDER — SODIUM CHLORIDE 0.9 % (FLUSH) 0.9 %
10 SYRINGE (ML) INJECTION
Status: DISCONTINUED | OUTPATIENT
Start: 2019-11-29 | End: 2019-11-29 | Stop reason: HOSPADM

## 2019-11-29 RX ADMIN — SODIUM CHLORIDE: 9 INJECTION, SOLUTION INTRAVENOUS at 10:11

## 2019-11-29 RX ADMIN — CEMIPLIMAB-RWLC: 50 INJECTION INTRAVENOUS at 10:11

## 2019-11-29 RX ADMIN — DEXTROSE 1 G: 50 INJECTION, SOLUTION INTRAVENOUS at 11:11

## 2019-11-29 RX ADMIN — ONDANSETRON 8 MG: 2 INJECTION INTRAMUSCULAR; INTRAVENOUS at 10:11

## 2019-11-29 NOTE — PLAN OF CARE
Plan of care reviewed with patient; patient verbalizes understanding. Medications reviewed and administered as ordered. VSS. Safety precautions maintained.

## 2019-11-29 NOTE — TELEPHONE ENCOUNTER
Pt's daughter called and stated that pt had a fall approximately 2 weeks ago and received 4 sutures into his left elbow. This elbow is now swollen and red. Should this pt report back to the ER or would you like to see him in this office?

## 2019-12-06 DIAGNOSIS — N32.89 BLADDER SPASM: ICD-10-CM

## 2019-12-06 RX ORDER — TROSPIUM CHLORIDE 20 MG/1
TABLET, FILM COATED ORAL
Qty: 30 TABLET | Refills: 5 | Status: SHIPPED | OUTPATIENT
Start: 2019-12-06 | End: 2020-08-09

## 2019-12-12 ENCOUNTER — TELEPHONE (OUTPATIENT)
Dept: FAMILY MEDICINE | Facility: CLINIC | Age: 84
End: 2019-12-12

## 2019-12-12 DIAGNOSIS — Z00.00 PREVENTATIVE HEALTH CARE: Primary | ICD-10-CM

## 2019-12-12 NOTE — TELEPHONE ENCOUNTER
Pt's daughter called and state [facility] and was wondering if you would order a urinalysis for the pt as well. I will pend it.

## 2019-12-18 ENCOUNTER — TELEPHONE (OUTPATIENT)
Dept: FAMILY MEDICINE | Facility: CLINIC | Age: 84
End: 2019-12-18

## 2019-12-18 DIAGNOSIS — R31.21 ASYMPTOMATIC MICROSCOPIC HEMATURIA: Primary | ICD-10-CM

## 2019-12-18 LAB
APPEARANCE UR: CLEAR
BACTERIA #/AREA URNS HPF: ABNORMAL /[HPF]
BILIRUB UR QL STRIP: NEGATIVE
COLOR UR: YELLOW
EPI CELLS #/AREA URNS HPF: ABNORMAL /HPF (ref 0–10)
GLUCOSE UR QL: NEGATIVE
HGB UR QL STRIP: ABNORMAL
KETONES UR QL STRIP: NEGATIVE
LEUKOCYTE ESTERASE UR QL STRIP: NEGATIVE
MICRO URNS: ABNORMAL
MUCOUS THREADS URNS QL MICRO: PRESENT
NITRITE UR QL STRIP: NEGATIVE
PH UR STRIP: 5.5 [PH] (ref 5–7.5)
PROT UR QL STRIP: NEGATIVE
RBC #/AREA URNS HPF: ABNORMAL /HPF (ref 0–2)
SP GR UR: 1.02 (ref 1–1.03)
UROBILINOGEN UR STRIP-MCNC: 0.2 MG/DL (ref 0.2–1)
WBC #/AREA URNS HPF: ABNORMAL /HPF (ref 0–5)

## 2019-12-18 NOTE — TELEPHONE ENCOUNTER
Urinalysis reveals hematuria.  Not sure of the chronicity of this as there is no previous reference to hematuria or bladder issues.  Get a postvoiding residual volume and repeat urine in 1 month.

## 2019-12-18 NOTE — TELEPHONE ENCOUNTER
Urinalysis showed +1 blood confirmed on microscopic analysis no evidence of UTI.  Repeat repeat the UA in 1 month and get an ultrasound to make sure there is no bladder outlet obstruction.

## 2019-12-19 ENCOUNTER — INFUSION (OUTPATIENT)
Dept: INFUSION THERAPY | Facility: HOSPITAL | Age: 84
End: 2019-12-19
Attending: INTERNAL MEDICINE
Payer: MEDICARE

## 2019-12-19 VITALS
DIASTOLIC BLOOD PRESSURE: 68 MMHG | HEART RATE: 86 BPM | TEMPERATURE: 96 F | RESPIRATION RATE: 20 BRPM | SYSTOLIC BLOOD PRESSURE: 116 MMHG

## 2019-12-19 DIAGNOSIS — C44.92 SQUAMOUS CELL CARCINOMA WITH CUTANEOUS HORN FORMATION: Primary | ICD-10-CM

## 2019-12-19 PROCEDURE — 96375 TX/PRO/DX INJ NEW DRUG ADDON: CPT | Mod: PN

## 2019-12-19 PROCEDURE — A4216 STERILE WATER/SALINE, 10 ML: HCPCS | Mod: PN | Performed by: NURSE PRACTITIONER

## 2019-12-19 PROCEDURE — 96413 CHEMO IV INFUSION 1 HR: CPT | Mod: PN

## 2019-12-19 PROCEDURE — 25000003 PHARM REV CODE 250: Mod: PN | Performed by: NURSE PRACTITIONER

## 2019-12-19 PROCEDURE — 63600175 PHARM REV CODE 636 W HCPCS: Mod: PN | Performed by: NURSE PRACTITIONER

## 2019-12-19 RX ORDER — SODIUM CHLORIDE 0.9 % (FLUSH) 0.9 %
10 SYRINGE (ML) INJECTION
Status: DISCONTINUED | OUTPATIENT
Start: 2019-12-19 | End: 2019-12-19 | Stop reason: HOSPADM

## 2019-12-19 RX ORDER — ONDANSETRON 2 MG/ML
8 INJECTION INTRAMUSCULAR; INTRAVENOUS ONCE
Status: COMPLETED | OUTPATIENT
Start: 2019-12-19 | End: 2019-12-19

## 2019-12-19 RX ADMIN — CEMIPLIMAB-RWLC: 50 INJECTION INTRAVENOUS at 10:12

## 2019-12-19 RX ADMIN — SODIUM CHLORIDE: 9 INJECTION, SOLUTION INTRAVENOUS at 10:12

## 2019-12-19 RX ADMIN — ONDANSETRON 8 MG: 2 INJECTION INTRAMUSCULAR; INTRAVENOUS at 10:12

## 2019-12-19 RX ADMIN — Medication 10 ML: at 10:12

## 2019-12-24 ENCOUNTER — TELEPHONE (OUTPATIENT)
Dept: FAMILY MEDICINE | Facility: CLINIC | Age: 84
End: 2019-12-24

## 2019-12-24 NOTE — TELEPHONE ENCOUNTER
----- Message from Alex Guadalupe MD sent at 12/18/2019  8:19 AM CST -----  Urinalysis showed +1 blood confirmed on microscopic analysis no evidence of UTI.  Repeat repeat the UA in 1 month and get an ultrasound to make sure there is no bladder outlet obstruction.

## 2019-12-26 ENCOUNTER — OFFICE VISIT (OUTPATIENT)
Dept: FAMILY MEDICINE | Facility: CLINIC | Age: 84
End: 2019-12-26
Payer: MEDICARE

## 2019-12-26 VITALS
HEART RATE: 67 BPM | OXYGEN SATURATION: 100 % | BODY MASS INDEX: 25.71 KG/M2 | HEIGHT: 73 IN | DIASTOLIC BLOOD PRESSURE: 68 MMHG | SYSTOLIC BLOOD PRESSURE: 130 MMHG | WEIGHT: 194 LBS

## 2019-12-26 DIAGNOSIS — L29.9 ITCHING: ICD-10-CM

## 2019-12-26 DIAGNOSIS — R31.21 ASYMPTOMATIC MICROSCOPIC HEMATURIA: Primary | ICD-10-CM

## 2019-12-26 DIAGNOSIS — C44.92 SQUAMOUS CELL CARCINOMA WITH CUTANEOUS HORN FORMATION: ICD-10-CM

## 2019-12-26 LAB
BILIRUB SERPL-MCNC: NORMAL MG/DL
BLOOD, POC UA: NORMAL
GLUCOSE UR QL STRIP: NORMAL
KETONES UR QL STRIP: NORMAL
LEUKOCYTE ESTERASE URINE, POC: NORMAL
NITRITE, POC UA: NORMAL
PH, POC UA: 5.5
PROTEIN, POC: NORMAL
SPECIFIC GRAVITY, POC UA: 1.02
UROBILINOGEN, POC UA: NORMAL

## 2019-12-26 PROCEDURE — 1126F PR PAIN SEVERITY QUANTIFIED, NO PAIN PRESENT: ICD-10-PCS | Mod: ,,, | Performed by: FAMILY MEDICINE

## 2019-12-26 PROCEDURE — 81003 URINALYSIS AUTO W/O SCOPE: CPT | Mod: PBBFAC | Performed by: FAMILY MEDICINE

## 2019-12-26 PROCEDURE — 1159F PR MEDICATION LIST DOCUMENTED IN MEDICAL RECORD: ICD-10-PCS | Mod: ,,, | Performed by: FAMILY MEDICINE

## 2019-12-26 PROCEDURE — 99213 OFFICE O/P EST LOW 20 MIN: CPT | Mod: 25,S$PBB,, | Performed by: FAMILY MEDICINE

## 2019-12-26 PROCEDURE — 1126F AMNT PAIN NOTED NONE PRSNT: CPT | Mod: ,,, | Performed by: FAMILY MEDICINE

## 2019-12-26 PROCEDURE — 1159F MED LIST DOCD IN RCRD: CPT | Mod: ,,, | Performed by: FAMILY MEDICINE

## 2019-12-26 PROCEDURE — 99214 OFFICE O/P EST MOD 30 MIN: CPT | Performed by: FAMILY MEDICINE

## 2019-12-26 PROCEDURE — 99213 PR OFFICE/OUTPT VISIT, EST, LEVL III, 20-29 MIN: ICD-10-PCS | Mod: 25,S$PBB,, | Performed by: FAMILY MEDICINE

## 2019-12-26 RX ORDER — HYDROXYZINE HYDROCHLORIDE 25 MG/1
25 TABLET, FILM COATED ORAL 3 TIMES DAILY PRN
Qty: 30 TABLET | Refills: 3 | Status: SHIPPED | OUTPATIENT
Start: 2019-12-26 | End: 2020-01-25

## 2019-12-26 NOTE — PROGRESS NOTES
Subjective:       Patient ID: Pasquale Rees is a 89 y.o. male.    Chief Complaint: Follow-up and Shortness of Breath      Patient is here to follow-up on hematuria he did have microscopic hematuria +1 with 11-30 red cells in the urine on microscopic cytology he did have a postvoiding residual volume done earlier this month which was 2 cc he reports having nocturia up to 3 times at night but no significant subjective amount of hesitancy or urgency.  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      Follow-up     Shortness of Breath         Allergies and Medications:   Review of patient's allergies indicates:   Allergen Reactions    Codeine Nausea And Vomiting     Current Outpatient Medications   Medication Sig Dispense Refill    cranberry 500 mg Cap       cyanocobalamin (VITAMIN B-12) 1000 MCG tablet Take 100 mcg by mouth once daily.      cyanocobalamin, vitamin B-12, 1,000 mcg Lozg       diltiaZEM (CARDIZEM) 120 MG tablet diltiazem 120 mg tablet   Take 1 tablet every day by oral route.      fish oil-omega-3 fatty acids 300-1,000 mg capsule Take by mouth once daily.      hydroCHLOROthiazide (HYDRODIURIL) 25 MG tablet TAKE ONE TABLET BY MOUTH ONCE DAILY 90 tablet 3    melatonin 10 mg Tab Take by mouth.      multivitamin (THERAGRAN) per tablet Take 1 tablet by mouth once daily.      omeprazole (PRILOSEC) 20 MG capsule   99    ondansetron (ZOFRAN) 8 MG tablet Take 1 tablet (8 mg total) by mouth every 8 (eight) hours as needed for Nausea. 30 tablet 0    pantoprazole (PROTONIX) 40 MG tablet Take 40 mg by mouth once daily.      triamcinolone acetonide 0.1% (KENALOG) 0.1 % cream Apply topically 3 (three) times daily. 28.4 g 3    trospium (SANCTURA) 20 mg Tab tablet TAKE ONE TABLET BY MOUTH ONCE A DAY 30 tablet 5    vit A/vit C/vit E/zinc/copper (PRESERVISION AREDS ORAL) ICaps AREDS       hydrOXYzine HCl (ATARAX) 25 MG tablet Take 1 tablet (25 mg total) by mouth 3 (three) times daily as needed for Itching. 30 tablet 3     No current facility-administered medications for this visit.        Family History:   History reviewed. No pertinent family history.    Social History:   Social History     Socioeconomic History    Marital status:      Spouse name: Not on file    Number of children: Not on file    Years of education: Not on file    Highest education level: Not on file   Occupational History    Not on file   Social Needs    Financial resource strain: Not on file    Food insecurity:     Worry: Not on file     Inability: Not on file    Transportation needs:     Medical: Not on file     Non-medical: Not on file   Tobacco Use    Smoking status: Never Smoker    Smokeless tobacco: Never Used   Substance and Sexual Activity    Alcohol use: No    Drug use: No    Sexual activity: Not on file   Lifestyle    Physical activity:     Days per week: Not on file     Minutes per session: Not on file    Stress: Not at all   Relationships    Social connections:     Talks on phone: Not on file     Gets together: Not on file     Attends Congregation service: Not on file     Active member of club or organization: Not on file     Attends meetings of clubs or organizations: Not on file     Relationship status: Not on file   Other Topics Concern    Not on file   Social History Narrative    ** Merged History Encounter **            Review of Systems   Respiratory: Positive for shortness of breath.        Objective:     Vitals:    12/26/19 1302   BP: 130/68   Pulse: 67        Physical Exam   Constitutional: He appears well-nourished. No distress.   HENT:   Head:       Skin: He is not diaphoretic.   Nursing note and vitals reviewed.    urinalysis was clear with negative leukocytes  Nitrates, and negative for blood today.  Assessment:       1. Asymptomatic microscopic hematuria    2. 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 Itching        Plan:       Pasquale was seen today for follow-up and shortness of breath.    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         Follow up in about 6 months (around 6/26/2020) for annual.

## 2020-01-07 RX ORDER — HYDROCHLOROTHIAZIDE 25 MG/1
TABLET ORAL
Qty: 90 TABLET | Refills: 3 | Status: SHIPPED | OUTPATIENT
Start: 2020-01-07 | End: 2020-06-24 | Stop reason: SDUPTHER

## 2020-01-08 LAB
APPEARANCE UR: CLEAR
BACTERIA #/AREA URNS HPF: ABNORMAL /[HPF]
BILIRUB UR QL STRIP: NEGATIVE
COLOR UR: YELLOW
EPI CELLS #/AREA URNS HPF: ABNORMAL /HPF (ref 0–10)
GLUCOSE UR QL: NEGATIVE
HGB UR QL STRIP: ABNORMAL
KETONES UR QL STRIP: NEGATIVE
LEUKOCYTE ESTERASE UR QL STRIP: NEGATIVE
MICRO URNS: ABNORMAL
MUCOUS THREADS URNS QL MICRO: PRESENT
NITRITE UR QL STRIP: NEGATIVE
PH UR STRIP: 5.5 [PH] (ref 5–7.5)
PROT UR QL STRIP: NEGATIVE
RBC #/AREA URNS HPF: ABNORMAL /HPF (ref 0–2)
SP GR UR: 1.01 (ref 1–1.03)
URINALYSIS REFLEX: ABNORMAL
UROBILINOGEN UR STRIP-MCNC: 0.2 MG/DL (ref 0.2–1)
WBC #/AREA URNS HPF: ABNORMAL /HPF (ref 0–5)

## 2020-01-08 NOTE — PROGRESS NOTES
Mild persistent microscopic hematuria bladder ultrasound was negative.  Will recheck in 3 months and consider  referral if persistent.

## 2020-01-09 ENCOUNTER — INFUSION (OUTPATIENT)
Dept: INFUSION THERAPY | Facility: HOSPITAL | Age: 85
End: 2020-01-09
Attending: INTERNAL MEDICINE
Payer: MEDICARE

## 2020-01-09 VITALS
HEIGHT: 73 IN | HEART RATE: 84 BPM | BODY MASS INDEX: 25.98 KG/M2 | TEMPERATURE: 98 F | DIASTOLIC BLOOD PRESSURE: 60 MMHG | SYSTOLIC BLOOD PRESSURE: 110 MMHG | WEIGHT: 196 LBS | RESPIRATION RATE: 18 BRPM

## 2020-01-09 DIAGNOSIS — C44.92 SQUAMOUS CELL CARCINOMA WITH CUTANEOUS HORN FORMATION: Primary | ICD-10-CM

## 2020-01-09 PROCEDURE — 63600175 PHARM REV CODE 636 W HCPCS: Mod: PN | Performed by: PHYSICIAN ASSISTANT

## 2020-01-09 PROCEDURE — 25000003 PHARM REV CODE 250: Mod: PN | Performed by: PHYSICIAN ASSISTANT

## 2020-01-09 PROCEDURE — A4216 STERILE WATER/SALINE, 10 ML: HCPCS | Mod: PN | Performed by: PHYSICIAN ASSISTANT

## 2020-01-09 PROCEDURE — 96413 CHEMO IV INFUSION 1 HR: CPT | Mod: PN

## 2020-01-09 PROCEDURE — 96375 TX/PRO/DX INJ NEW DRUG ADDON: CPT | Mod: PN

## 2020-01-09 RX ORDER — HEPARIN 100 UNIT/ML
500 SYRINGE INTRAVENOUS
Status: DISCONTINUED | OUTPATIENT
Start: 2020-01-09 | End: 2020-01-09 | Stop reason: HOSPADM

## 2020-01-09 RX ORDER — SODIUM CHLORIDE 0.9 % (FLUSH) 0.9 %
10 SYRINGE (ML) INJECTION
Status: DISCONTINUED | OUTPATIENT
Start: 2020-01-09 | End: 2020-01-09 | Stop reason: HOSPADM

## 2020-01-09 RX ORDER — ONDANSETRON 2 MG/ML
8 INJECTION INTRAMUSCULAR; INTRAVENOUS ONCE
Status: COMPLETED | OUTPATIENT
Start: 2020-01-09 | End: 2020-01-09

## 2020-01-09 RX ADMIN — CEMIPLIMAB-RWLC: 50 INJECTION INTRAVENOUS at 11:01

## 2020-01-09 RX ADMIN — ONDANSETRON 8 MG: 2 INJECTION INTRAMUSCULAR; INTRAVENOUS at 11:01

## 2020-01-09 RX ADMIN — Medication 10 ML: at 12:01

## 2020-01-09 RX ADMIN — SODIUM CHLORIDE: 9 INJECTION, SOLUTION INTRAVENOUS at 11:01

## 2020-02-10 RX ORDER — HYDROXYZINE HYDROCHLORIDE 25 MG/1
TABLET, FILM COATED ORAL
Qty: 30 TABLET | Refills: 3 | Status: SHIPPED | OUTPATIENT
Start: 2020-02-10 | End: 2020-02-19 | Stop reason: SDUPTHER

## 2020-02-20 ENCOUNTER — TELEPHONE (OUTPATIENT)
Dept: INFUSION THERAPY | Facility: HOSPITAL | Age: 85
End: 2020-02-20

## 2020-02-20 NOTE — TELEPHONE ENCOUNTER
Spoke with daughter she wants pauline on same day due to it is getting harder for her to get him in and out of the car r/s pauline to follow md pauline

## 2020-02-20 NOTE — TELEPHONE ENCOUNTER
----- Message from Radha Mg sent at 2/20/2020  9:27 AM CST -----  Caller: Patient's daughter- Oren                  Callback number: none given                        Reason:  Request please cancel patient's infusion apt on 03/19/2020. Please advise       Thank you

## 2020-05-18 ENCOUNTER — DOCUMENTATION ONLY (OUTPATIENT)
Dept: INFUSION THERAPY | Facility: HOSPITAL | Age: 85
End: 2020-05-18

## 2020-05-18 NOTE — PROGRESS NOTES
[12:03 PM] Chely Solano      what about Negrita [id number]? had a phone visit at 1045 note isnt signed but i wasnt sure if Janie was behind on call or not  ?  [1:37 PM] [email or web address]      Per Janie, patient not cleared for treatment. On hold since jan    ?[1:45 PM] [email or web address]      Per Janie, take patient off schedule; he will decided when he goes back on.     ?[1:45 PM] Chely Solano      Ok    Pt removed from schedule as requested.

## 2020-06-04 ENCOUNTER — TELEPHONE (OUTPATIENT)
Dept: INFUSION THERAPY | Facility: HOSPITAL | Age: 85
End: 2020-06-04

## 2020-06-04 DIAGNOSIS — K80.50 BILE DUCT CALCULUS WITHOUT CHOLECYSTITIS AND NO OBSTRUCTION: Primary | ICD-10-CM

## 2020-06-04 NOTE — TELEPHONE ENCOUNTER
----- Message from Esthela Gutierrez sent at 6/4/2020  2:37 PM CDT -----  Caller:     Oren Umanzor pt daughter           Callback number:    [phone]                    Reason:She said that her dads infusion had been stopped and need to be canceled.

## 2020-06-12 ENCOUNTER — HOSPITAL ENCOUNTER (OUTPATIENT)
Dept: RADIOLOGY | Facility: HOSPITAL | Age: 85
Discharge: HOME | End: 2020-06-12
Attending: INTERNAL MEDICINE
Payer: MEDICARE

## 2020-06-12 DIAGNOSIS — K80.50 BILE DUCT CALCULUS WITHOUT CHOLECYSTITIS AND NO OBSTRUCTION: ICD-10-CM

## 2020-06-12 PROCEDURE — 76376 3D RENDER W/INTRP POSTPROCES: CPT | Mod: TC,PO

## 2020-06-12 PROCEDURE — 74181 MRI ABDOMEN W/O CONTRAST: CPT | Mod: TC,PO

## 2020-06-17 ENCOUNTER — TELEPHONE (OUTPATIENT)
Dept: FAMILY MEDICINE | Facility: CLINIC | Age: 85
End: 2020-06-17

## 2020-06-17 NOTE — TELEPHONE ENCOUNTER
Patient's daughter called and left a message. She is requesting that her fathers upcoming appointment be changed to an audio appointment. She stated that she is afraid to take her dad out in public due to recent events. Is that possible? He was coming in for hematuria.

## 2020-06-17 NOTE — TELEPHONE ENCOUNTER
I spoke with the daughter. I advised her of the precautions that are in place in the clinic. I also explained that  is currently out of the office this week and stated that it would be best for him to come in. She stated that she would like to get an answer on Monday when  does return to the office. She stated that it was just for routine refills on his medications.

## 2020-06-22 NOTE — TELEPHONE ENCOUNTER
Attempted to contact the patient via his daughter Oren Martinez left a message on voicemail. patient does need to come in for a visit to assess this hematuria.

## 2020-06-24 ENCOUNTER — TELEPHONE (OUTPATIENT)
Dept: FAMILY MEDICINE | Facility: CLINIC | Age: 85
End: 2020-06-24

## 2020-06-24 ENCOUNTER — OFFICE VISIT (OUTPATIENT)
Dept: FAMILY MEDICINE | Facility: CLINIC | Age: 85
End: 2020-06-24
Payer: MEDICARE

## 2020-06-24 VITALS
OXYGEN SATURATION: 95 % | HEIGHT: 73 IN | TEMPERATURE: 98 F | HEART RATE: 81 BPM | BODY MASS INDEX: 27.06 KG/M2 | SYSTOLIC BLOOD PRESSURE: 122 MMHG | WEIGHT: 204.19 LBS | DIASTOLIC BLOOD PRESSURE: 60 MMHG

## 2020-06-24 DIAGNOSIS — R31.21 ASYMPTOMATIC MICROSCOPIC HEMATURIA: Primary | ICD-10-CM

## 2020-06-24 DIAGNOSIS — R60.0 EDEMA LEG: ICD-10-CM

## 2020-06-24 LAB
BILIRUB UR QL STRIP: NEGATIVE
GLUCOSE UR QL STRIP: NEGATIVE
KETONES UR QL STRIP: NEGATIVE
LEUKOCYTE ESTERASE UR QL STRIP: NEGATIVE
PH, POC UA: 6
POC BLOOD, URINE: NEGATIVE
POC NITRATES, URINE: NEGATIVE
PROT UR QL STRIP: NEGATIVE
SP GR UR STRIP: 1.02 (ref 1–1.03)
UROBILINOGEN UR STRIP-ACNC: NORMAL (ref 0.3–2.2)

## 2020-06-24 PROCEDURE — 81003 URINALYSIS AUTO W/O SCOPE: CPT | Mod: PBBFAC | Performed by: FAMILY MEDICINE

## 2020-06-24 PROCEDURE — 99213 PR OFFICE/OUTPT VISIT, EST, LEVL III, 20-29 MIN: ICD-10-PCS | Mod: S$PBB,,, | Performed by: FAMILY MEDICINE

## 2020-06-24 PROCEDURE — 99215 OFFICE O/P EST HI 40 MIN: CPT | Performed by: FAMILY MEDICINE

## 2020-06-24 PROCEDURE — 99213 OFFICE O/P EST LOW 20 MIN: CPT | Mod: S$PBB,,, | Performed by: FAMILY MEDICINE

## 2020-06-24 RX ORDER — POTASSIUM CHLORIDE 750 MG/1
10 TABLET, EXTENDED RELEASE ORAL DAILY
Qty: 30 TABLET | Refills: 0 | Status: SHIPPED | OUTPATIENT
Start: 2020-06-24 | End: 2020-07-24

## 2020-06-24 RX ORDER — IBUPROFEN 100 MG/5ML
SUSPENSION, ORAL (FINAL DOSE FORM) ORAL
COMMUNITY
End: 2021-12-23

## 2020-06-24 RX ORDER — HYDROCHLOROTHIAZIDE 25 MG/1
TABLET ORAL
COMMUNITY
End: 2020-06-24

## 2020-06-24 RX ORDER — ACETAMINOPHEN 500 MG
TABLET ORAL
COMMUNITY
End: 2021-03-03

## 2020-06-24 RX ORDER — HYDROCHLOROTHIAZIDE 25 MG/1
25 TABLET ORAL DAILY
Qty: 90 TABLET | Refills: 3 | Status: SHIPPED | OUTPATIENT
Start: 2020-06-24 | End: 2021-06-21

## 2020-06-24 RX ORDER — FUROSEMIDE 20 MG/1
20 TABLET ORAL DAILY
Qty: 15 TABLET | Refills: 0 | Status: SHIPPED | OUTPATIENT
Start: 2020-06-24 | End: 2021-03-03

## 2020-06-24 NOTE — PROGRESS NOTES
Subjective:       Patient ID: Pasquale Rees is a 89 y.o. male.    Chief Complaint: Hematuria (6 month follow up )      Patient is here to follow-up on previous hematuria he is not actively having any bloody urine.  He does have chronic ankle edema which has recently gotten worse.  He was recently evaluated by Dr. Ponce for gallstones there are chronic and not causing a problem and not surgical at this time    Hematuria  This is a chronic problem. The problem is unchanged. His pain is at a severity of 0/10. He is experiencing no pain. Pertinent negatives include no abdominal pain, chills, fever, nausea, vomiting or sore throat.   Edema  This is a chronic problem. The problem has been gradually worsening. Pertinent negatives include no abdominal pain, anorexia, arthralgias, change in bowel habit, chest pain, chills, congestion, coughing, diaphoresis, fatigue, fever, headaches, joint swelling, myalgias, nausea, neck pain, numbness, rash, sore throat, swollen glands, urinary symptoms, vertigo, visual change, vomiting or weakness.       Allergies and Medications:   Review of patient's allergies indicates:   Allergen Reactions    Codeine Nausea And Vomiting     Current Outpatient Medications   Medication Sig Dispense Refill    ascorbic acid, vitamin C, (VITAMIN C) 1000 MG tablet Vitamin C      fish oil-omega-3 fatty acids 300-1,000 mg capsule Take by mouth once daily.      hydroCHLOROthiazide (HYDRODIURIL) 25 MG tablet TAKE ONE TABLET BY MOUTH ONCE DAILY 90 tablet 3    hydrOXYzine HCL (ATARAX) 25 MG tablet TAKE ONE TABLET BY MOUTH THREE TIMES DAILY AS NEEDED FOR ITCHING 30 tablet 2    melatonin (MELATIN) melatonin   5 mg      multivitamin (THERAGRAN) per tablet Take 1 tablet by mouth once daily.      pantoprazole (PROTONIX) 40 MG tablet Take 40 mg by mouth once daily.      trospium (SANCTURA) 20 mg Tab tablet TAKE ONE TABLET BY MOUTH ONCE A DAY 30 tablet 5    cranberry 500 mg Cap       cyanocobalamin  (VITAMIN B-12) 1000 MCG tablet Take 100 mcg by mouth once daily.      cyanocobalamin, vitamin B-12, 1,000 mcg Lozg       diazePAM (VALIUM) 5 MG tablet Take 1-2 tablets (5-10 mg total) by mouth On call Procedure for Anxiety. Take 45 minutes prior to PET scan (Patient not taking: Reported on 6/24/2020) 2 tablet 0    diltiaZEM (CARDIZEM) 120 MG tablet diltiazem 120 mg tablet   Take 1 tablet every day by oral route.      furosemide (LASIX) 20 MG tablet Take 1 tablet (20 mg total) by mouth once daily. For peripheral edema limited to 3 days at a time no more than 15 tablets per month 15 tablet 0    hydroCHLOROthiazide (HYDRODIURIL) 25 MG tablet hydrochlorothiazide  25 mg tabs      lidocaine-prilocaine (EMLA) cream Apply topically as needed. Apply to port site 45 min prior to infusion (Patient not taking: Reported on 6/24/2020) 5 g 1    melatonin 10 mg Tab Take by mouth.      omeprazole (PRILOSEC) 20 MG capsule   99    potassium chloride SA (K-DUR,KLOR-CON) 10 MEQ tablet Take 1 tablet (10 mEq total) by mouth once daily. With each dose of Lasix. 30 tablet 0    predniSONE (DELTASONE) 10 MG tablet Take 1 tablet (10 mg total) by mouth once daily. (Patient not taking: Reported on 6/24/2020) 90 tablet 1    triamcinolone acetonide 0.1% (KENALOG) 0.1 % cream Apply topically 3 (three) times daily. (Patient not taking: Reported on 6/24/2020) 28.4 g 3    vit A/vit C/vit E/zinc/copper (PRESERVISION AREDS ORAL) ICaps AREDS       No current facility-administered medications for this visit.        Family History:   History reviewed. No pertinent family history.    Social History:   Social History     Socioeconomic History    Marital status:      Spouse name: Not on file    Number of children: Not on file    Years of education: Not on file    Highest education level: Not on file   Occupational History    Not on file   Social Needs    Financial resource strain: Not on file    Food insecurity     Worry: Not on file      Inability: Not on file    Transportation needs     Medical: Not on file     Non-medical: Not on file   Tobacco Use    Smoking status: Never Smoker    Smokeless tobacco: Never Used   Substance and Sexual Activity    Alcohol use: No    Drug use: No    Sexual activity: Not on file   Lifestyle    Physical activity     Days per week: Not on file     Minutes per session: Not on file    Stress: Not at all   Relationships    Social connections     Talks on phone: Not on file     Gets together: Not on file     Attends Jew service: Not on file     Active member of club or organization: Not on file     Attends meetings of clubs or organizations: Not on file     Relationship status: Not on file   Other Topics Concern    Not on file   Social History Narrative    ** Merged History Encounter **            Review of Systems   Constitutional: Negative for chills, diaphoresis, fatigue and fever.   HENT: Negative for congestion and sore throat.    Respiratory: Negative for cough.    Cardiovascular: Negative for chest pain.   Gastrointestinal: Negative for abdominal pain, anorexia, change in bowel habit, nausea and vomiting.   Genitourinary: Positive for hematuria.   Musculoskeletal: Negative for arthralgias, joint swelling, myalgias and neck pain.   Skin: Negative for rash.   Neurological: Negative for vertigo, weakness, numbness and headaches.       Objective:     Vitals:    06/24/20 1332   BP: 122/60   Pulse: 81   Temp: 97.9 °F (36.6 °C)        Physical Exam  Vitals signs and nursing note reviewed. Exam conducted with a chaperone present.   Constitutional:       Appearance: He is well-developed and normal weight. He is not diaphoretic.   HENT:      Head: Normocephalic.   Eyes:      Conjunctiva/sclera: Conjunctivae normal.      Pupils: Pupils are equal, round, and reactive to light.   Cardiovascular:      Rate and Rhythm: Normal rate and regular rhythm.      Heart sounds: Normal heart sounds. No murmur. No  friction rub. No gallop.    Pulmonary:      Effort: Pulmonary effort is normal. No respiratory distress.      Breath sounds: Normal breath sounds. No stridor. No wheezing or rales.   Chest:      Chest wall: No tenderness.   Musculoskeletal:      Right lower leg: Edema present.      Left lower leg: Edema (Plus three +3) present.   Skin:     General: Skin is warm and dry.   Neurological:      Mental Status: He is alert.   Psychiatric:         Behavior: Behavior normal.         Thought Content: Thought content normal.         Judgment: Judgment normal.       urinalysis shows negative blood specific gravity of 10 20  Assessment:       1. Asymptomatic microscopic hematuria    2. Edema leg        Plan:       Pasquale was seen today for hematuria.    Diagnoses and all orders for this visit:    Asymptomatic microscopic hematuria  -     POCT Urinalysis, Dipstick, Automated, W/O Scope    Edema leg  -     furosemide (LASIX) 20 MG tablet; Take 1 tablet (20 mg total) by mouth once daily. For peripheral edema limited to 3 days at a time no more than 15 tablets per month  -     potassium chloride SA (K-DUR,KLOR-CON) 10 MEQ tablet; Take 1 tablet (10 mEq total) by mouth once daily. With each dose of Lasix.  -     Basic metabolic panel; Future         Follow up in about 3 months (around 9/24/2020) for follow up edema.

## 2020-09-26 LAB
BUN SERPL-MCNC: 22 MG/DL (ref 10–36)
BUN/CREAT SERPL: 18 (ref 10–24)
CALCIUM SERPL-MCNC: 9.3 MG/DL (ref 8.6–10.2)
CHLORIDE SERPL-SCNC: 99 MMOL/L (ref 96–106)
CO2 SERPL-SCNC: 29 MMOL/L (ref 20–29)
CREAT SERPL-MCNC: 1.2 MG/DL (ref 0.76–1.27)
GLUCOSE SERPL-MCNC: 103 MG/DL (ref 65–99)
POTASSIUM SERPL-SCNC: 3.7 MMOL/L (ref 3.5–5.2)
SODIUM SERPL-SCNC: 143 MMOL/L (ref 134–144)

## 2020-09-28 ENCOUNTER — OFFICE VISIT (OUTPATIENT)
Dept: FAMILY MEDICINE | Facility: CLINIC | Age: 85
End: 2020-09-28
Payer: MEDICARE

## 2020-09-28 DIAGNOSIS — R31.21 ASYMPTOMATIC MICROSCOPIC HEMATURIA: Primary | ICD-10-CM

## 2020-09-28 DIAGNOSIS — L89.42 PRESSURE INJURY OF CONTIGUOUS REGION INVOLVING BACK AND BUTTOCK, STAGE 2, UNSPECIFIED LATERALITY: ICD-10-CM

## 2020-09-28 DIAGNOSIS — I10 ESSENTIAL HYPERTENSION: ICD-10-CM

## 2020-09-28 PROCEDURE — 99442 PR PHYSICIAN TELEPHONE EVALUATION 11-20 MIN: ICD-10-PCS | Mod: 95,,, | Performed by: FAMILY MEDICINE

## 2020-09-28 PROCEDURE — 99442 PR PHYSICIAN TELEPHONE EVALUATION 11-20 MIN: CPT | Mod: 95,,, | Performed by: FAMILY MEDICINE

## 2020-09-28 RX ORDER — INFLUENZA A VIRUS A/MICHIGAN/45/2015 X-275 (H1N1) ANTIGEN (FORMALDEHYDE INACTIVATED), INFLUENZA A VIRUS A/SINGAPORE/INFIMH-16-0019/2016 IVR-186 (H3N2) ANTIGEN (FORMALDEHYDE INACTIVATED), INFLUENZA B VIRUS B/PHUKET/3073/2013 ANTIGEN (FORMALDEHYDE INACTIVATED), AND INFLUENZA B VIRUS B/MARYLAND/15/2016 BX-69A ANTIGEN (FORMALDEHYDE INACTIVATED) 60; 60; 60; 60 UG/.7ML; UG/.7ML; UG/.7ML; UG/.7ML
INJECTION, SUSPENSION INTRAMUSCULAR
COMMUNITY
Start: 2020-09-16 | End: 2021-12-23

## 2020-09-28 NOTE — PROGRESS NOTES
Established Patient - Audio Only Telehealth Visit   home bp 134/62, wt217, 135/69, 123/67  The patient location is: home  The chief complaint leading to consultation is: f/u blood in urine  Patient has to do audio visit as he has no phone capability for video and is home bound he has home health and home sitter.  He had blood in the urine on a last E ER visit but there is no been no visible blood a urinalysis was done at [facility] but the results are not in epic or the media section.    Lab Results   Component Value Date    WBC 8.26 06/12/2020    HGB 11.8 (L) 06/12/2020    HCT 37.0 (L) 06/12/2020     06/12/2020    ALT 18 06/12/2020    AST 19 06/12/2020     09/25/2020    K 3.7 09/25/2020    CL 99 09/25/2020    CREATININE 1.20 09/25/2020    BUN 22 09/25/2020    CO2 29 09/25/2020    TSH 2.790 05/14/2020   Urinalysis done at [facility]  Patient is doing well he does get up during the night 3-4 times, appetite is good.    Visit type: Virtual visit with audio only (telephone).   Total time spent with patient: 15'.    Subjective:       Patient ID: Pasquale Rees is a 89 y.o. male.    Chief Complaint: No chief complaint on file.      HPI    Allergies and Medications:   Review of patient's allergies indicates:   Allergen Reactions    Codeine Nausea And Vomiting     Current Outpatient Medications   Medication Sig Dispense Refill    ascorbic acid, vitamin C, (VITAMIN C) 1000 MG tablet Vitamin C      cranberry 500 mg Cap       cyanocobalamin (VITAMIN B-12) 1000 MCG tablet Take 100 mcg by mouth once daily.      cyanocobalamin, vitamin B-12, 1,000 mcg Lozg       diazePAM (VALIUM) 5 MG tablet Take 1-2 tablets (5-10 mg total) by mouth On call Procedure for Anxiety. Take 45 minutes prior to PET scan (Patient not taking: Reported on 6/24/2020) 2 tablet 0    diltiaZEM (CARDIZEM) 120 MG tablet diltiazem 120 mg tablet   Take 1 tablet every day by oral route.      fish oil-omega-3 fatty acids 300-1,000 mg capsule Take  by mouth once daily.      FLUZONE HIGHDOSE QUAD 20-21  mcg/0.7 mL Syrg       furosemide (LASIX) 20 MG tablet Take 1 tablet (20 mg total) by mouth once daily. For peripheral edema limited to 3 days at a time no more than 15 tablets per month 15 tablet 0    hydroCHLOROthiazide (HYDRODIURIL) 25 MG tablet Take 1 tablet (25 mg total) by mouth once daily. 90 tablet 3    hydrOXYzine HCL (ATARAX) 25 MG tablet TAKE 1 TABLET BY MOUTH THREE TIMES DAILY AS NEEDED FOR ITCHING 30 tablet 2    lidocaine-prilocaine (EMLA) cream Apply topically as needed. Apply to port site 45 min prior to infusion (Patient not taking: Reported on 6/24/2020) 5 g 1    melatonin (MELATIN) melatonin   5 mg      melatonin 10 mg Tab Take by mouth.      multivitamin (THERAGRAN) per tablet Take 1 tablet by mouth once daily.      omeprazole (PRILOSEC) 20 MG capsule   99    pantoprazole (PROTONIX) 40 MG tablet Take 40 mg by mouth once daily.      predniSONE (DELTASONE) 10 MG tablet Take 1 tablet (10 mg total) by mouth once daily. (Patient not taking: Reported on 6/24/2020) 90 tablet 1    triamcinolone acetonide 0.1% (KENALOG) 0.1 % cream Apply topically 3 (three) times daily. 28.4 g 3    trospium (SANCTURA) 20 mg Tab tablet TAKE ONE TABLET BY MOUTH ONCE A DAY 30 tablet 5    vit A/vit C/vit E/zinc/copper (PRESERVISION AREDS ORAL) ICaps AREDS       No current facility-administered medications for this visit.        Family History:   No family history on file.    Social History:   Social History     Socioeconomic History    Marital status:      Spouse name: Not on file    Number of children: Not on file    Years of education: Not on file    Highest education level: Not on file   Occupational History    Not on file   Social Needs    Financial resource strain: Not on file    Food insecurity     Worry: Not on file     Inability: Not on file    Transportation needs     Medical: Not on file     Non-medical: Not on file   Tobacco Use     Smoking status: Never Smoker    Smokeless tobacco: Never Used   Substance and Sexual Activity    Alcohol use: No    Drug use: No    Sexual activity: Not on file   Lifestyle    Physical activity     Days per week: Not on file     Minutes per session: Not on file    Stress: Not at all   Relationships    Social connections     Talks on phone: Not on file     Gets together: Not on file     Attends Evangelical service: Not on file     Active member of club or organization: Not on file     Attends meetings of clubs or organizations: Not on file     Relationship status: Not on file   Other Topics Concern    Not on file   Social History Narrative    ** Merged History Encounter **            Review of Systems    Objective:   There were no vitals filed for this visit.     Physical Exam  Vitals signs reviewed.   Constitutional:       Appearance: Normal appearance.   Neurological:      Mental Status: He is alert.   Psychiatric:         Mood and Affect: Mood normal.         Behavior: Behavior normal.         Thought Content: Thought content normal.         Assessment:       Chronic microscopic hematuria.  Pressure ulcers on the back and buttock resolved  Hypertension stable  Plan:       Diagnoses and all orders for this visit:    Asymptomatic menopausal state    Pressure injury of contiguous region involving back and buttock, stage 2, unspecified laterality    Essential hypertension         Follow up in about 3 months (around 12/28/2020) for follow up HTN, microscopic hematuria.         HPI: hematuria     Assessment and plan:    Chronic asymptomatic hematuria.  Repeat urinalysis is pending  Pressure ulcer of the back and buttock resolved.  Gait abnormality due to debilitation.    Follow-up in 3 months via virtual visit or face to face.               This service was not originating from a related E/M service provided within the previous 7 days nor will  to an E/M service or procedure within the next 24 hours or  my soonest available appointment.  Prevailing standard of care was able to be met in this audio-only visit.

## 2020-10-01 ENCOUNTER — TELEPHONE (OUTPATIENT)
Dept: FAMILY MEDICINE | Facility: CLINIC | Age: 85
End: 2020-10-01

## 2020-12-08 ENCOUNTER — TELEPHONE (OUTPATIENT)
Dept: FAMILY MEDICINE | Facility: CLINIC | Age: 85
End: 2020-12-08

## 2020-12-08 DIAGNOSIS — I10 ESSENTIAL HYPERTENSION: ICD-10-CM

## 2020-12-08 DIAGNOSIS — E78.2 MIXED HYPERLIPIDEMIA: Primary | ICD-10-CM

## 2020-12-08 NOTE — TELEPHONE ENCOUNTER
Pt's daughter called and requested lab orders for pt prior to 12/15 appt. These will need to go to [facility]. Also, daughter has asked if this can be a virtual appt due to pt being 89 y/o. Appt is a 3 mo edema f/u. Please advise.

## 2020-12-09 ENCOUNTER — TELEPHONE (OUTPATIENT)
Dept: FAMILY MEDICINE | Facility: CLINIC | Age: 85
End: 2020-12-09

## 2020-12-09 NOTE — TELEPHONE ENCOUNTER
----- Message from Darlene Wascom sent at 12/8/2020  3:02 PM CST -----  Daughter wanted to  know if his upcoming appointment can be a virtual visit? I did let her know that the labs were order to [facility] and that yall were gone for the day but would be back tomorrow morning.

## 2020-12-11 ENCOUNTER — TELEPHONE (OUTPATIENT)
Dept: FAMILY MEDICINE | Facility: CLINIC | Age: 85
End: 2020-12-11

## 2020-12-11 LAB
ALBUMIN SERPL-MCNC: 4 G/DL (ref 3.5–4.6)
ALBUMIN/GLOB SERPL: 1.3 {RATIO} (ref 1.2–2.2)
ALP SERPL-CCNC: 92 IU/L (ref 39–117)
ALT SERPL-CCNC: 9 IU/L (ref 0–44)
AST SERPL-CCNC: 15 IU/L (ref 0–40)
BILIRUB SERPL-MCNC: 0.4 MG/DL (ref 0–1.2)
BUN SERPL-MCNC: 20 MG/DL (ref 10–36)
BUN/CREAT SERPL: 15 (ref 10–24)
CALCIUM SERPL-MCNC: 9.3 MG/DL (ref 8.6–10.2)
CHLORIDE SERPL-SCNC: 99 MMOL/L (ref 96–106)
CHOLEST SERPL-MCNC: 179 MG/DL (ref 100–199)
CO2 SERPL-SCNC: 31 MMOL/L (ref 20–29)
CREAT SERPL-MCNC: 1.36 MG/DL (ref 0.76–1.27)
GLOBULIN SER CALC-MCNC: 3 G/DL (ref 1.5–4.5)
GLUCOSE SERPL-MCNC: 96 MG/DL (ref 65–99)
HDLC SERPL-MCNC: 50 MG/DL
LDLC SERPL CALC-MCNC: 111 MG/DL (ref 0–99)
POTASSIUM SERPL-SCNC: 4 MMOL/L (ref 3.5–5.2)
PROT SERPL-MCNC: 7 G/DL (ref 6–8.5)
SODIUM SERPL-SCNC: 143 MMOL/L (ref 134–144)
TRIGL SERPL-MCNC: 99 MG/DL (ref 0–149)
VLDLC SERPL CALC-MCNC: 18 MG/DL (ref 5–40)

## 2020-12-11 NOTE — TELEPHONE ENCOUNTER
----- Message from Alex Guadalupe MD sent at 12/11/2020  8:11 AM CST -----  Results Ok, notify patient.

## 2020-12-15 ENCOUNTER — OFFICE VISIT (OUTPATIENT)
Dept: FAMILY MEDICINE | Facility: CLINIC | Age: 85
End: 2020-12-15
Payer: MEDICARE

## 2020-12-15 DIAGNOSIS — I10 ESSENTIAL HYPERTENSION: Primary | ICD-10-CM

## 2020-12-15 DIAGNOSIS — E78.2 MIXED HYPERLIPIDEMIA: ICD-10-CM

## 2020-12-15 DIAGNOSIS — R60.0 EDEMA LEG: ICD-10-CM

## 2020-12-15 DIAGNOSIS — Z23 IMMUNIZATION DUE: ICD-10-CM

## 2020-12-15 PROCEDURE — 99442 PR PHYSICIAN TELEPHONE EVALUATION 11-20 MIN: ICD-10-PCS | Mod: 95,,, | Performed by: FAMILY MEDICINE

## 2020-12-15 PROCEDURE — 99442 PR PHYSICIAN TELEPHONE EVALUATION 11-20 MIN: CPT | Mod: 95,,, | Performed by: FAMILY MEDICINE

## 2020-12-15 NOTE — PROGRESS NOTES
Subjective:        The chief complaint leading to consultation is:  Patient is here for 3 month follow-up he is followed by [facility] tomorrow home health needs face-to-face visits unable to come out of the house secondary to COVID-19  The patient location is:  Home  Visit type: Virtual visit with synchronous audio/video or audio only  This was a phone conversation in lieu of in-person visit due to the coronavirus emergency. Patient acknowledged and agreed to the telephone encounter.     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     Itching     Asymptomatic microscopic hematuria  Lately having some burning of feet- using   BP Readings from Last 3 Encounters:  06/24/20 : 122/60  03/18/20 : (!) 155/68  02/19/20 : (!) 145/70  Lab Results       Component                Value               Date                       WBC                      8.26                06/12/2020                 HGB                      11.8 (L)            06/12/2020                 HCT                      37.0 (L)            06/12/2020                 PLT                      262                 06/12/2020                 CHOL                     179                 12/10/2020                 TRIG                     99                  12/10/2020                 HDL                      50                  12/10/2020                 ALT                      9                   12/10/2020                  AST                      15                  12/10/2020                 NA                       143                 12/10/2020                 K                        4.0                 12/10/2020                 CL                       99                  12/10/2020                 CREATININE               1.36 (H)            12/10/2020                 BUN                      20                  12/10/2020                 CO2                      31 (H)              12/10/2020                 TSH                      2.790               05/14/2020            Patient's blood pressures at home have been will normal with systolics never above 140.  Family does not want to get him out of the house because of COVID concerns and vaccinations will be ordered via home health.      Past Surgical History:   Procedure Laterality Date    APPENDECTOMY      EYE SURGERY      HERNIA REPAIR      JOINT REPLACEMENT      SPINE SURGERY      TOTAL KNEE ARTHROPLASTY      2     Past Medical History:   Diagnosis Date    Anxiety     Arthritis     Blood transfusion     Cataract     Cataract     Dementia     temporary due to stay in ICU    Dementia 12/13/2017    Gallstones     GERD (gastroesophageal reflux disease)     Hyperlipidemia     Hypertension     Neuromuscular disorder     Pneumonia 11/2017; 5/2-18    hospitalized for both    Skin cancer of face 9/11/2018     No family history on file.     Social History:   Marital Status:   Alcohol History:  reports no history of alcohol use.  Tobacco History:  reports that he has never smoked. He has never used smokeless tobacco.  Drug History:  reports no history of drug use.    Review of patient's allergies indicates:   Allergen Reactions    Codeine Nausea And Vomiting       Current Outpatient Medications   Medication Sig Dispense Refill    ascorbic acid, vitamin C, (VITAMIN C) 1000 MG tablet Vitamin C      cranberry 500 mg Cap       cyanocobalamin (VITAMIN  B-12) 1000 MCG tablet Take 100 mcg by mouth once daily.      cyanocobalamin, vitamin B-12, 1,000 mcg Lozg       diazePAM (VALIUM) 5 MG tablet Take 1-2 tablets (5-10 mg total) by mouth On call Procedure for Anxiety. Take 45 minutes prior to PET scan (Patient not taking: Reported on 6/24/2020) 2 tablet 0    diltiaZEM (CARDIZEM) 120 MG tablet diltiazem 120 mg tablet   Take 1 tablet every day by oral route.      fish oil-omega-3 fatty acids 300-1,000 mg capsule Take by mouth once daily.      FLUZONE HIGHDOSE QUAD 20-21  mcg/0.7 mL Syrg       furosemide (LASIX) 20 MG tablet Take 1 tablet (20 mg total) by mouth once daily. For peripheral edema limited to 3 days at a time no more than 15 tablets per month 15 tablet 0    hydroCHLOROthiazide (HYDRODIURIL) 25 MG tablet Take 1 tablet (25 mg total) by mouth once daily. 90 tablet 3    hydrOXYzine HCL (ATARAX) 25 MG tablet TAKE 1 TABLET BY MOUTH THREE TIMES DAILY AS NEEDED FOR ITCHING 30 tablet 2    lidocaine-prilocaine (EMLA) cream Apply topically as needed. Apply to port site 45 min prior to infusion (Patient not taking: Reported on 6/24/2020) 5 g 1    melatonin (MELATIN) melatonin   5 mg      melatonin 10 mg Tab Take by mouth.      multivitamin (THERAGRAN) per tablet Take 1 tablet by mouth once daily.      omeprazole (PRILOSEC) 20 MG capsule   99    pantoprazole (PROTONIX) 40 MG tablet Take 40 mg by mouth once daily.      predniSONE (DELTASONE) 10 MG tablet Take 1 tablet (10 mg total) by mouth once daily. (Patient not taking: Reported on 6/24/2020) 90 tablet 1    triamcinolone acetonide 0.1% (KENALOG) 0.1 % cream Apply topically 3 (three) times daily. 28.4 g 3    trospium (SANCTURA) 20 mg Tab tablet TAKE ONE TABLET BY MOUTH ONCE A DAY 30 tablet 5    vit A/vit C/vit E/zinc/copper (PRESERVISION AREDS ORAL) ICaps AREDS       No current facility-administered medications for this visit.        Review of Systems      Objective:        Physical Exam:    Physical Exam  Vitals signs and nursing note reviewed.   Constitutional:       Appearance: He is well-developed.   Eyes:      Conjunctiva/sclera: Conjunctivae normal.   Pulmonary:      Effort: Pulmonary effort is normal.      Breath sounds: Normal breath sounds.      Comments: Patient has audible voice is slightly hoarse but normal for him.  No respiratory distress or audible wheezes.  Skin:     General: Skin is dry.   Neurological:      Mental Status: He is alert.              Assessment:       1. Essential hypertension    2. Immunization due    3. Edema leg    4. Mixed hyperlipidemia      Plan:   Essential hypertension    Immunization due  -     Influenza - Quadrivalent - High Dose (65+) (PF) (IM); Future; Expected date: 12/15/2020    Edema leg    Mixed hyperlipidemia      Follow up in about 3 months (around 3/15/2021) for follow up HTN, video visit.    Total time spent with patient:  15'    Each patient to whom he or she provides medical services by telemedicine is:  (1) informed of the relationship between the physician and patient and the respective role of any other health care provider with respect to management of the patient; and (2) notified that he or she may decline to receive medical services by telemedicine and may withdraw from such care at any time.    This note was created using [employer] voice recognition software that occasionally misinterprets phrases or words. Established Patient - Audio Only Telehealth Visit     The patient location is:  Home  The chief complaint leading to consultation is:  Follow-up on lab and hypertension  Visit type: Virtual visit with audio only (telephone)  Total time spent with patient: 15'       The reason for the audio only service rather than synchronous audio and video virtual visit was related to technical difficulties or patient preference/necessity.     Each patient to whom I provide medical services by telemedicine is:  (1) informed of the relationship between the  physician and patient and the respective role of any other health care provider with respect to management of the patient; and (2) notified that they may decline to receive medical services by telemedicine and may withdraw from such care at any time. Patient verbally consented to receive this service via voice-only telephone call.                               This service was not originating from a related E/M service provided within the previous 7 days nor will  to an E/M service or procedure within the next 24 hours or my soonest available appointment.  Prevailing standard of care was able to be met in this audio-only visit.

## 2020-12-18 ENCOUNTER — TELEPHONE (OUTPATIENT)
Dept: FAMILY MEDICINE | Facility: CLINIC | Age: 85
End: 2020-12-18

## 2020-12-18 DIAGNOSIS — G62.9 NEUROPATHY: Primary | ICD-10-CM

## 2020-12-18 RX ORDER — CAPSAICIN 0 G/G
1 CREAM TOPICAL 2 TIMES DAILY
Qty: 120 G | Refills: 3 | COMMUNITY
Start: 2020-12-18 | End: 2021-03-03

## 2020-12-18 NOTE — TELEPHONE ENCOUNTER
Patient had audio visit this week and you told him you would send in cream to [facility] but they have not received the prescription

## 2020-12-18 NOTE — TELEPHONE ENCOUNTER
Does called and discussed with patient's daughter he has some tingling and burning in his feet but no true rash scaling redness or ulceration this is most likely from neuropathy from his other conditions and I will send in some capsaicin cream.

## 2021-02-04 ENCOUNTER — TELEPHONE (OUTPATIENT)
Dept: FAMILY MEDICINE | Facility: CLINIC | Age: 86
End: 2021-02-04

## 2021-02-04 DIAGNOSIS — R30.0 DYSURIA: Primary | ICD-10-CM

## 2021-03-03 ENCOUNTER — OFFICE VISIT (OUTPATIENT)
Dept: FAMILY MEDICINE | Facility: CLINIC | Age: 86
End: 2021-03-03
Payer: MEDICARE

## 2021-03-03 VITALS
HEART RATE: 84 BPM | HEIGHT: 68 IN | DIASTOLIC BLOOD PRESSURE: 68 MMHG | WEIGHT: 220 LBS | BODY MASS INDEX: 33.34 KG/M2 | SYSTOLIC BLOOD PRESSURE: 132 MMHG

## 2021-03-03 DIAGNOSIS — R32 URINARY INCONTINENCE, UNSPECIFIED TYPE: ICD-10-CM

## 2021-03-03 DIAGNOSIS — L84 CALLUS OF FOOT: ICD-10-CM

## 2021-03-03 DIAGNOSIS — I87.2 VENOUS INSUFFICIENCY OF BOTH LOWER EXTREMITIES: Primary | ICD-10-CM

## 2021-03-03 DIAGNOSIS — L29.9 PRURITUS: ICD-10-CM

## 2021-03-03 DIAGNOSIS — G62.9 NEUROPATHY: ICD-10-CM

## 2021-03-03 DIAGNOSIS — N18.31 STAGE 3A CHRONIC KIDNEY DISEASE: ICD-10-CM

## 2021-03-03 DIAGNOSIS — Z85.828 HISTORY OF SKIN CANCER: ICD-10-CM

## 2021-03-03 DIAGNOSIS — L98.9 SCALP LESION: ICD-10-CM

## 2021-03-03 PROCEDURE — 99214 PR OFFICE/OUTPT VISIT, EST, LEVL IV, 30-39 MIN: ICD-10-PCS | Mod: S$GLB,,, | Performed by: NURSE PRACTITIONER

## 2021-03-03 PROCEDURE — 99214 OFFICE O/P EST MOD 30 MIN: CPT | Mod: S$GLB,,, | Performed by: NURSE PRACTITIONER

## 2021-03-03 RX ORDER — GABAPENTIN 300 MG/1
300 CAPSULE ORAL NIGHTLY
Qty: 30 CAPSULE | Refills: 5 | Status: SHIPPED | OUTPATIENT
Start: 2021-03-03 | End: 2021-09-02 | Stop reason: SDUPTHER

## 2021-03-03 RX ORDER — MUPIROCIN 20 MG/G
OINTMENT TOPICAL 2 TIMES DAILY
Qty: 22 G | Refills: 2 | Status: SHIPPED | OUTPATIENT
Start: 2021-03-03 | End: 2021-12-23

## 2021-03-03 RX ORDER — HYDROXYZINE HYDROCHLORIDE 25 MG/1
25 TABLET, FILM COATED ORAL 3 TIMES DAILY PRN
Qty: 90 TABLET | Refills: 2 | Status: SHIPPED | OUTPATIENT
Start: 2021-03-03 | End: 2021-07-28 | Stop reason: SDUPTHER

## 2021-03-06 LAB
AMORPH SED URNS QL MICRO: ABNORMAL /HPF
APPEARANCE UR: ABNORMAL
BACTERIA #/AREA URNS HPF: ABNORMAL /HPF
BACTERIA UR CULT: ABNORMAL
BILIRUB UR QL STRIP: NEGATIVE
COLOR UR: YELLOW
GLUCOSE UR QL STRIP: NEGATIVE
HGB UR QL STRIP: ABNORMAL
HYALINE CASTS #/AREA URNS LPF: ABNORMAL /LPF
KETONES UR QL STRIP: NEGATIVE
LEUKOCYTE ESTERASE UR QL STRIP: ABNORMAL
NITRITE UR QL STRIP: NEGATIVE
PH UR STRIP: ABNORMAL [PH] (ref 5–8)
PROT UR QL STRIP: ABNORMAL
RBC #/AREA URNS HPF: ABNORMAL /HPF
SERVICE CMNT-IMP: ABNORMAL
SERVICE CMNT-IMP: ABNORMAL
SP GR UR STRIP: 1.01 (ref 1–1.03)
SQUAMOUS #/AREA URNS HPF: ABNORMAL /HPF
WBC #/AREA URNS HPF: ABNORMAL /HPF

## 2021-03-06 RX ORDER — NITROFURANTOIN 25; 75 MG/1; MG/1
100 CAPSULE ORAL 2 TIMES DAILY
Qty: 14 CAPSULE | Refills: 0 | Status: SHIPPED | OUTPATIENT
Start: 2021-03-06 | End: 2021-03-13

## 2021-04-06 ENCOUNTER — OFFICE VISIT (OUTPATIENT)
Dept: PODIATRY | Facility: CLINIC | Age: 86
End: 2021-04-06
Payer: MEDICARE

## 2021-04-06 VITALS — BODY MASS INDEX: 32.43 KG/M2 | WEIGHT: 214 LBS | HEIGHT: 68 IN

## 2021-04-06 DIAGNOSIS — R60.0 BILATERAL LOWER EXTREMITY EDEMA: Primary | ICD-10-CM

## 2021-04-06 DIAGNOSIS — G60.8 PERIPHERAL SENSORY NEUROPATHY: ICD-10-CM

## 2021-04-06 DIAGNOSIS — R60.0 EDEMA LEG: ICD-10-CM

## 2021-04-06 DIAGNOSIS — L84 PRE-ULCERATIVE CORN OR CALLOUS: ICD-10-CM

## 2021-04-06 DIAGNOSIS — I87.2 VENOUS STASIS DERMATITIS OF BOTH LOWER EXTREMITIES: ICD-10-CM

## 2021-04-06 DIAGNOSIS — L84 CALLUS OF FOOT: ICD-10-CM

## 2021-04-06 PROCEDURE — 11056 PR TRIM BENIGN HYPERKERATOTIC SKIN LESION,2-4: ICD-10-PCS | Mod: Q8,S$GLB,, | Performed by: PODIATRIST

## 2021-04-06 PROCEDURE — 99203 OFFICE O/P NEW LOW 30 MIN: CPT | Mod: 25,S$GLB,, | Performed by: PODIATRIST

## 2021-04-06 PROCEDURE — 11056 PARNG/CUTG B9 HYPRKR LES 2-4: CPT | Mod: Q8,S$GLB,, | Performed by: PODIATRIST

## 2021-04-06 PROCEDURE — 99203 PR OFFICE/OUTPT VISIT, NEW, LEVL III, 30-44 MIN: ICD-10-PCS | Mod: 25,S$GLB,, | Performed by: PODIATRIST

## 2021-04-28 ENCOUNTER — TELEPHONE (OUTPATIENT)
Dept: DERMATOLOGY | Facility: CLINIC | Age: 86
End: 2021-04-28

## 2021-05-18 ENCOUNTER — TELEPHONE (OUTPATIENT)
Dept: FAMILY MEDICINE | Facility: CLINIC | Age: 86
End: 2021-05-18

## 2021-05-18 RX ORDER — METHYLPREDNISOLONE 4 MG/1
TABLET ORAL
Qty: 1 PACKAGE | Refills: 0 | Status: SHIPPED | OUTPATIENT
Start: 2021-05-18 | End: 2021-12-23

## 2021-05-20 ENCOUNTER — TELEPHONE (OUTPATIENT)
Dept: FAMILY MEDICINE | Facility: CLINIC | Age: 86
End: 2021-05-20

## 2021-05-21 ENCOUNTER — OFFICE VISIT (OUTPATIENT)
Dept: PODIATRY | Facility: CLINIC | Age: 86
End: 2021-05-21
Payer: MEDICARE

## 2021-05-21 VITALS
WEIGHT: 214 LBS | HEART RATE: 68 BPM | SYSTOLIC BLOOD PRESSURE: 138 MMHG | DIASTOLIC BLOOD PRESSURE: 81 MMHG | HEIGHT: 68 IN | BODY MASS INDEX: 32.43 KG/M2

## 2021-05-21 DIAGNOSIS — M79.671 FOOT PAIN, RIGHT: ICD-10-CM

## 2021-05-21 DIAGNOSIS — L97.511 ULCER OF RIGHT FOOT, LIMITED TO BREAKDOWN OF SKIN: Primary | ICD-10-CM

## 2021-05-21 PROCEDURE — 99213 PR OFFICE/OUTPT VISIT, EST, LEVL III, 20-29 MIN: ICD-10-PCS | Mod: 25,S$GLB,, | Performed by: PODIATRIST

## 2021-05-21 PROCEDURE — 99213 OFFICE O/P EST LOW 20 MIN: CPT | Mod: 25,S$GLB,, | Performed by: PODIATRIST

## 2021-05-21 PROCEDURE — 97597 DBRDMT OPN WND 1ST 20 CM/<: CPT | Mod: S$GLB,,, | Performed by: PODIATRIST

## 2021-05-21 PROCEDURE — 97597 WOUND DEBRIDEMENT: ICD-10-PCS | Mod: S$GLB,,, | Performed by: PODIATRIST

## 2021-05-21 RX ORDER — DOXYCYCLINE 100 MG/1
100 CAPSULE ORAL 2 TIMES DAILY
Qty: 14 CAPSULE | Refills: 0 | Status: SHIPPED | OUTPATIENT
Start: 2021-05-21 | End: 2021-05-28

## 2021-05-26 ENCOUNTER — OFFICE VISIT (OUTPATIENT)
Dept: PODIATRY | Facility: CLINIC | Age: 86
End: 2021-05-26
Payer: MEDICARE

## 2021-05-26 VITALS
BODY MASS INDEX: 32.43 KG/M2 | WEIGHT: 214 LBS | SYSTOLIC BLOOD PRESSURE: 104 MMHG | HEIGHT: 68 IN | DIASTOLIC BLOOD PRESSURE: 71 MMHG | HEART RATE: 77 BPM

## 2021-05-26 DIAGNOSIS — M79.671 FOOT PAIN, RIGHT: ICD-10-CM

## 2021-05-26 DIAGNOSIS — L97.512 ULCER OF RIGHT FOOT WITH FAT LAYER EXPOSED: Primary | ICD-10-CM

## 2021-05-26 DIAGNOSIS — R60.0 EDEMA LEG: ICD-10-CM

## 2021-05-26 DIAGNOSIS — G60.8 PERIPHERAL SENSORY NEUROPATHY: ICD-10-CM

## 2021-05-26 DIAGNOSIS — R60.0 BILATERAL LOWER EXTREMITY EDEMA: ICD-10-CM

## 2021-05-26 DIAGNOSIS — I87.2 VENOUS STASIS DERMATITIS OF BOTH LOWER EXTREMITIES: ICD-10-CM

## 2021-05-26 PROCEDURE — 11042 DBRDMT SUBQ TIS 1ST 20SQCM/<: CPT | Mod: S$GLB,,, | Performed by: PODIATRIST

## 2021-05-26 PROCEDURE — 99213 OFFICE O/P EST LOW 20 MIN: CPT | Mod: 25,S$GLB,, | Performed by: PODIATRIST

## 2021-05-26 PROCEDURE — 99213 PR OFFICE/OUTPT VISIT, EST, LEVL III, 20-29 MIN: ICD-10-PCS | Mod: 25,S$GLB,, | Performed by: PODIATRIST

## 2021-05-26 PROCEDURE — 11042 WOUND DEBRIDEMENT: ICD-10-PCS | Mod: S$GLB,,, | Performed by: PODIATRIST

## 2021-05-28 PROCEDURE — G0179 PR HOME HEALTH MD RECERTIFICATION: ICD-10-PCS | Mod: ,,, | Performed by: PODIATRIST

## 2021-05-28 PROCEDURE — G0179 MD RECERTIFICATION HHA PT: HCPCS | Mod: ,,, | Performed by: PODIATRIST

## 2021-06-02 ENCOUNTER — TELEPHONE (OUTPATIENT)
Dept: FAMILY MEDICINE | Facility: CLINIC | Age: 86
End: 2021-06-02

## 2021-06-04 ENCOUNTER — OFFICE VISIT (OUTPATIENT)
Dept: WOUND CARE | Facility: HOSPITAL | Age: 86
End: 2021-06-04
Attending: PODIATRIST
Payer: MEDICARE

## 2021-06-04 VITALS
HEART RATE: 70 BPM | DIASTOLIC BLOOD PRESSURE: 59 MMHG | RESPIRATION RATE: 20 BRPM | TEMPERATURE: 98 F | SYSTOLIC BLOOD PRESSURE: 118 MMHG

## 2021-06-04 DIAGNOSIS — L97.512 ULCER OF RIGHT FOOT, WITH FAT LAYER EXPOSED: Primary | ICD-10-CM

## 2021-06-04 DIAGNOSIS — G60.8 PERIPHERAL SENSORY NEUROPATHY: ICD-10-CM

## 2021-06-04 DIAGNOSIS — R60.0 BILATERAL LOWER EXTREMITY EDEMA: ICD-10-CM

## 2021-06-04 DIAGNOSIS — L97.512 ULCER OF RIGHT FOOT WITH FAT LAYER EXPOSED: ICD-10-CM

## 2021-06-04 DIAGNOSIS — R60.0 EDEMA LEG: ICD-10-CM

## 2021-06-04 PROCEDURE — 99213 PR OFFICE/OUTPT VISIT, EST, LEVL III, 20-29 MIN: ICD-10-PCS | Mod: 25,,, | Performed by: PODIATRIST

## 2021-06-04 PROCEDURE — 99213 OFFICE O/P EST LOW 20 MIN: CPT | Mod: 25 | Performed by: PODIATRIST

## 2021-06-04 PROCEDURE — 11042 DBRDMT SUBQ TIS 1ST 20SQCM/<: CPT | Performed by: PODIATRIST

## 2021-06-04 PROCEDURE — 11042 PR DEBRIDEMENT, SKIN, SUB-Q TISSUE,=<20 SQ CM: ICD-10-PCS | Mod: ,,, | Performed by: PODIATRIST

## 2021-06-04 PROCEDURE — 99213 OFFICE O/P EST LOW 20 MIN: CPT | Mod: 25,,, | Performed by: PODIATRIST

## 2021-06-04 PROCEDURE — 11042 DBRDMT SUBQ TIS 1ST 20SQCM/<: CPT | Mod: ,,, | Performed by: PODIATRIST

## 2021-06-07 ENCOUNTER — DOCUMENT SCAN (OUTPATIENT)
Dept: HOME HEALTH SERVICES | Facility: HOSPITAL | Age: 86
End: 2021-06-07
Payer: MEDICARE

## 2021-06-08 ENCOUNTER — TELEPHONE (OUTPATIENT)
Dept: FAMILY MEDICINE | Facility: CLINIC | Age: 86
End: 2021-06-08

## 2021-06-08 DIAGNOSIS — E78.2 MIXED HYPERLIPIDEMIA: ICD-10-CM

## 2021-06-08 DIAGNOSIS — N18.31 STAGE 3A CHRONIC KIDNEY DISEASE: ICD-10-CM

## 2021-06-08 DIAGNOSIS — Z79.899 ENCOUNTER FOR LONG-TERM (CURRENT) USE OF OTHER MEDICATIONS: Primary | ICD-10-CM

## 2021-06-08 DIAGNOSIS — I10 ESSENTIAL HYPERTENSION: ICD-10-CM

## 2021-06-11 ENCOUNTER — DOCUMENT SCAN (OUTPATIENT)
Dept: HOME HEALTH SERVICES | Facility: HOSPITAL | Age: 86
End: 2021-06-11
Payer: MEDICARE

## 2021-06-11 ENCOUNTER — OFFICE VISIT (OUTPATIENT)
Dept: WOUND CARE | Facility: HOSPITAL | Age: 86
End: 2021-06-11
Attending: PODIATRIST
Payer: MEDICARE

## 2021-06-11 DIAGNOSIS — R60.0 EDEMA LEG: ICD-10-CM

## 2021-06-11 DIAGNOSIS — L97.512 ULCER OF RIGHT FOOT, WITH FAT LAYER EXPOSED: Primary | ICD-10-CM

## 2021-06-11 DIAGNOSIS — G60.8 PERIPHERAL SENSORY NEUROPATHY: ICD-10-CM

## 2021-06-11 PROCEDURE — 11042 DBRDMT SUBQ TIS 1ST 20SQCM/<: CPT | Mod: ,,, | Performed by: PODIATRIST

## 2021-06-11 PROCEDURE — 11042 DBRDMT SUBQ TIS 1ST 20SQCM/<: CPT | Performed by: PODIATRIST

## 2021-06-11 PROCEDURE — 11042 PR DEBRIDEMENT, SKIN, SUB-Q TISSUE,=<20 SQ CM: ICD-10-PCS | Mod: ,,, | Performed by: PODIATRIST

## 2021-06-15 ENCOUNTER — EXTERNAL HOME HEALTH (OUTPATIENT)
Dept: HOME HEALTH SERVICES | Facility: HOSPITAL | Age: 86
End: 2021-06-15
Payer: MEDICARE

## 2021-06-25 ENCOUNTER — OFFICE VISIT (OUTPATIENT)
Dept: WOUND CARE | Facility: HOSPITAL | Age: 86
End: 2021-06-25
Attending: PODIATRIST
Payer: MEDICARE

## 2021-06-25 VITALS
RESPIRATION RATE: 20 BRPM | HEART RATE: 78 BPM | TEMPERATURE: 98 F | SYSTOLIC BLOOD PRESSURE: 139 MMHG | DIASTOLIC BLOOD PRESSURE: 70 MMHG

## 2021-06-25 DIAGNOSIS — G60.8 PERIPHERAL SENSORY NEUROPATHY: ICD-10-CM

## 2021-06-25 DIAGNOSIS — L97.512 ULCER OF RIGHT FOOT, WITH FAT LAYER EXPOSED: Primary | ICD-10-CM

## 2021-06-25 DIAGNOSIS — R60.0 EDEMA LEG: ICD-10-CM

## 2021-06-25 PROCEDURE — 99499 UNLISTED E&M SERVICE: CPT | Mod: ,,, | Performed by: PODIATRIST

## 2021-06-25 PROCEDURE — 11042 DBRDMT SUBQ TIS 1ST 20SQCM/<: CPT | Mod: PN | Performed by: PODIATRIST

## 2021-06-25 PROCEDURE — 11042 PR DEBRIDEMENT, SKIN, SUB-Q TISSUE,=<20 SQ CM: ICD-10-PCS | Mod: ,,, | Performed by: PODIATRIST

## 2021-06-25 PROCEDURE — 99499 NO LOS: ICD-10-PCS | Mod: ,,, | Performed by: PODIATRIST

## 2021-06-25 PROCEDURE — 11042 DBRDMT SUBQ TIS 1ST 20SQCM/<: CPT | Mod: ,,, | Performed by: PODIATRIST

## 2021-07-01 ENCOUNTER — PATIENT MESSAGE (OUTPATIENT)
Dept: ADMINISTRATIVE | Facility: OTHER | Age: 86
End: 2021-07-01

## 2021-07-09 ENCOUNTER — OFFICE VISIT (OUTPATIENT)
Dept: WOUND CARE | Facility: HOSPITAL | Age: 86
End: 2021-07-09
Attending: PODIATRIST
Payer: MEDICARE

## 2021-07-09 VITALS
SYSTOLIC BLOOD PRESSURE: 175 MMHG | DIASTOLIC BLOOD PRESSURE: 87 MMHG | HEART RATE: 76 BPM | RESPIRATION RATE: 18 BRPM | TEMPERATURE: 98 F

## 2021-07-09 DIAGNOSIS — L08.9 INFECTION OF SKIN AND SUBCUTANEOUS TISSUE: ICD-10-CM

## 2021-07-09 DIAGNOSIS — R60.0 BILATERAL LOWER EXTREMITY EDEMA: ICD-10-CM

## 2021-07-09 DIAGNOSIS — L97.512 ULCER OF RIGHT FOOT WITH FAT LAYER EXPOSED: ICD-10-CM

## 2021-07-09 DIAGNOSIS — L97.512 ULCER OF RIGHT FOOT, WITH FAT LAYER EXPOSED: Primary | ICD-10-CM

## 2021-07-09 DIAGNOSIS — G60.8 PERIPHERAL SENSORY NEUROPATHY: ICD-10-CM

## 2021-07-09 DIAGNOSIS — L03.119 CELLULITIS AND ABSCESS OF LEG: ICD-10-CM

## 2021-07-09 DIAGNOSIS — R60.0 EDEMA LEG: ICD-10-CM

## 2021-07-09 DIAGNOSIS — L02.419 CELLULITIS AND ABSCESS OF LEG: ICD-10-CM

## 2021-07-09 PROCEDURE — 99213 PR OFFICE/OUTPT VISIT, EST, LEVL III, 20-29 MIN: ICD-10-PCS | Mod: ,,, | Performed by: PODIATRIST

## 2021-07-09 PROCEDURE — 99213 OFFICE O/P EST LOW 20 MIN: CPT | Mod: ,,, | Performed by: PODIATRIST

## 2021-07-09 PROCEDURE — 29581 APPL MULTLAYER CMPRN SYS LEG: CPT | Mod: PN

## 2021-07-09 RX ORDER — DOXYCYCLINE 100 MG/1
100 CAPSULE ORAL 2 TIMES DAILY
Qty: 28 CAPSULE | Refills: 0 | Status: SHIPPED | OUTPATIENT
Start: 2021-07-09 | End: 2021-07-23

## 2021-07-16 ENCOUNTER — OFFICE VISIT (OUTPATIENT)
Dept: WOUND CARE | Facility: HOSPITAL | Age: 86
End: 2021-07-16
Attending: PODIATRIST
Payer: MEDICARE

## 2021-07-16 DIAGNOSIS — L97.512 ULCER OF RIGHT FOOT, WITH FAT LAYER EXPOSED: Primary | ICD-10-CM

## 2021-07-16 DIAGNOSIS — R60.0 EDEMA LEG: ICD-10-CM

## 2021-07-16 DIAGNOSIS — R60.0 BILATERAL LOWER EXTREMITY EDEMA: ICD-10-CM

## 2021-07-16 DIAGNOSIS — L97.512 ULCER OF RIGHT FOOT WITH FAT LAYER EXPOSED: ICD-10-CM

## 2021-07-16 DIAGNOSIS — G60.8 PERIPHERAL SENSORY NEUROPATHY: ICD-10-CM

## 2021-07-16 PROCEDURE — 99213 PR OFFICE/OUTPT VISIT, EST, LEVL III, 20-29 MIN: ICD-10-PCS | Mod: ,,, | Performed by: PODIATRIST

## 2021-07-16 PROCEDURE — 99213 OFFICE O/P EST LOW 20 MIN: CPT | Mod: PN | Performed by: PODIATRIST

## 2021-07-16 PROCEDURE — 99213 OFFICE O/P EST LOW 20 MIN: CPT | Mod: ,,, | Performed by: PODIATRIST

## 2021-07-18 VITALS
DIASTOLIC BLOOD PRESSURE: 78 MMHG | RESPIRATION RATE: 16 BRPM | TEMPERATURE: 98 F | HEART RATE: 78 BPM | SYSTOLIC BLOOD PRESSURE: 150 MMHG

## 2021-07-26 ENCOUNTER — DOCUMENT SCAN (OUTPATIENT)
Dept: HOME HEALTH SERVICES | Facility: HOSPITAL | Age: 86
End: 2021-07-26
Payer: MEDICARE

## 2021-07-28 DIAGNOSIS — L29.9 PRURITUS: ICD-10-CM

## 2021-07-28 RX ORDER — HYDROXYZINE HYDROCHLORIDE 25 MG/1
25 TABLET, FILM COATED ORAL 3 TIMES DAILY PRN
Qty: 90 TABLET | Refills: 2 | Status: SHIPPED | OUTPATIENT
Start: 2021-07-28 | End: 2021-12-23 | Stop reason: SDUPTHER

## 2021-08-11 ENCOUNTER — TELEPHONE (OUTPATIENT)
Dept: FAMILY MEDICINE | Facility: CLINIC | Age: 86
End: 2021-08-11

## 2021-08-13 ENCOUNTER — TELEPHONE (OUTPATIENT)
Dept: FAMILY MEDICINE | Facility: CLINIC | Age: 86
End: 2021-08-13

## 2021-08-17 ENCOUNTER — TELEPHONE (OUTPATIENT)
Dept: FAMILY MEDICINE | Facility: CLINIC | Age: 86
End: 2021-08-17

## 2021-08-21 LAB
ALBUMIN SERPL-MCNC: 3.7 G/DL (ref 3.6–5.1)
ALBUMIN/CREAT UR: 480 MCG/MG CREAT
ALBUMIN/GLOB SERPL: 1.1 (CALC) (ref 1–2.5)
ALP SERPL-CCNC: 83 U/L (ref 35–144)
ALT SERPL-CCNC: 9 U/L (ref 9–46)
APPEARANCE UR: ABNORMAL
AST SERPL-CCNC: 15 U/L (ref 10–35)
BACTERIA #/AREA URNS HPF: ABNORMAL /HPF
BACTERIA UR CULT: ABNORMAL
BASOPHILS # BLD AUTO: 57 CELLS/UL (ref 0–200)
BASOPHILS NFR BLD AUTO: 0.7 %
BILIRUB SERPL-MCNC: 0.6 MG/DL (ref 0.2–1.2)
BILIRUB UR QL STRIP: NEGATIVE
BUN SERPL-MCNC: 19 MG/DL (ref 7–25)
BUN/CREAT SERPL: 16 (CALC) (ref 6–22)
CALCIUM SERPL-MCNC: 9.1 MG/DL (ref 8.6–10.3)
CHLORIDE SERPL-SCNC: 98 MMOL/L (ref 98–110)
CHOLEST SERPL-MCNC: 182 MG/DL
CHOLEST/HDLC SERPL: 3.4 (CALC)
CO2 SERPL-SCNC: 30 MMOL/L (ref 20–32)
COLOR UR: YELLOW
CREAT SERPL-MCNC: 1.19 MG/DL (ref 0.7–1.11)
CREAT UR-MCNC: 79 MG/DL (ref 20–320)
EOSINOPHIL # BLD AUTO: 230 CELLS/UL (ref 15–500)
EOSINOPHIL NFR BLD AUTO: 2.8 %
ERYTHROCYTE [DISTWIDTH] IN BLOOD BY AUTOMATED COUNT: 14.7 % (ref 11–15)
GLOBULIN SER CALC-MCNC: 3.5 G/DL (CALC) (ref 1.9–3.7)
GLUCOSE SERPL-MCNC: 93 MG/DL (ref 65–99)
GLUCOSE UR QL STRIP: NEGATIVE
HCT VFR BLD AUTO: 39.6 % (ref 38.5–50)
HDLC SERPL-MCNC: 53 MG/DL
HGB BLD-MCNC: 13.2 G/DL (ref 13.2–17.1)
HGB UR QL STRIP: ABNORMAL
HYALINE CASTS #/AREA URNS LPF: ABNORMAL /LPF
KETONES UR QL STRIP: NEGATIVE
LDLC SERPL CALC-MCNC: 107 MG/DL (CALC)
LEUKOCYTE ESTERASE UR QL STRIP: ABNORMAL
LYMPHOCYTES # BLD AUTO: 2157 CELLS/UL (ref 850–3900)
LYMPHOCYTES NFR BLD AUTO: 26.3 %
MCH RBC QN AUTO: 29.3 PG (ref 27–33)
MCHC RBC AUTO-ENTMCNC: 33.3 G/DL (ref 32–36)
MCV RBC AUTO: 88 FL (ref 80–100)
MICROALBUMIN UR-MCNC: 37.9 MG/DL
MONOCYTES # BLD AUTO: 664 CELLS/UL (ref 200–950)
MONOCYTES NFR BLD AUTO: 8.1 %
NEUTROPHILS # BLD AUTO: 5092 CELLS/UL (ref 1500–7800)
NEUTROPHILS NFR BLD AUTO: 62.1 %
NITRITE UR QL STRIP: POSITIVE
NONHDLC SERPL-MCNC: 129 MG/DL (CALC)
PH UR STRIP: 6.5 [PH] (ref 5–8)
PLATELET # BLD AUTO: 240 THOUSAND/UL (ref 140–400)
PMV BLD REES-ECKER: 8.4 FL (ref 7.5–12.5)
POTASSIUM SERPL-SCNC: 3.9 MMOL/L (ref 3.5–5.3)
PROT SERPL-MCNC: 7.2 G/DL (ref 6.1–8.1)
PROT UR QL STRIP: ABNORMAL
RBC # BLD AUTO: 4.5 MILLION/UL (ref 4.2–5.8)
RBC #/AREA URNS HPF: ABNORMAL /HPF
SERVICE CMNT-IMP: ABNORMAL
SODIUM SERPL-SCNC: 140 MMOL/L (ref 135–146)
SP GR UR STRIP: 1.01 (ref 1–1.03)
SQUAMOUS #/AREA URNS HPF: ABNORMAL /HPF
TRIGL SERPL-MCNC: 121 MG/DL
TSH SERPL-ACNC: 2.77 MIU/L (ref 0.4–4.5)
WBC # BLD AUTO: 8.2 THOUSAND/UL (ref 3.8–10.8)
WBC #/AREA URNS HPF: ABNORMAL /HPF

## 2021-08-23 ENCOUNTER — TELEPHONE (OUTPATIENT)
Dept: FAMILY MEDICINE | Facility: CLINIC | Age: 86
End: 2021-08-23

## 2021-08-23 DIAGNOSIS — N39.0 URINARY TRACT INFECTION WITHOUT HEMATURIA, SITE UNSPECIFIED: Primary | ICD-10-CM

## 2021-08-23 RX ORDER — NITROFURANTOIN 25; 75 MG/1; MG/1
100 CAPSULE ORAL 2 TIMES DAILY
Qty: 14 CAPSULE | Refills: 0 | Status: SHIPPED | OUTPATIENT
Start: 2021-08-23 | End: 2021-12-23

## 2021-09-02 DIAGNOSIS — G62.9 NEUROPATHY: ICD-10-CM

## 2021-09-02 RX ORDER — GABAPENTIN 300 MG/1
300 CAPSULE ORAL NIGHTLY
Qty: 30 CAPSULE | Refills: 5 | Status: SHIPPED | OUTPATIENT
Start: 2021-09-02 | End: 2022-09-02

## 2021-09-13 DIAGNOSIS — N32.89 BLADDER SPASM: ICD-10-CM

## 2021-09-13 RX ORDER — TROSPIUM CHLORIDE 20 MG/1
20 TABLET, FILM COATED ORAL DAILY
Qty: 90 TABLET | Refills: 0 | Status: SHIPPED | OUTPATIENT
Start: 2021-09-13 | End: 2021-12-23 | Stop reason: SDUPTHER

## 2021-09-13 RX ORDER — TROSPIUM CHLORIDE 20 MG/1
TABLET, FILM COATED ORAL
Qty: 30 TABLET | Refills: 0 | Status: SHIPPED | OUTPATIENT
Start: 2021-09-13 | End: 2021-12-23 | Stop reason: SDUPTHER

## 2021-10-27 ENCOUNTER — DOCUMENT SCAN (OUTPATIENT)
Dept: HOME HEALTH SERVICES | Facility: HOSPITAL | Age: 86
End: 2021-10-27
Payer: MEDICARE

## 2021-12-01 ENCOUNTER — OFFICE VISIT (OUTPATIENT)
Dept: PODIATRY | Facility: CLINIC | Age: 86
End: 2021-12-01
Payer: MEDICARE

## 2021-12-01 VITALS — WEIGHT: 240 LBS | OXYGEN SATURATION: 97 % | HEIGHT: 68 IN | HEART RATE: 64 BPM | BODY MASS INDEX: 36.37 KG/M2

## 2021-12-01 DIAGNOSIS — L84 CALLUS OF FOOT: ICD-10-CM

## 2021-12-01 DIAGNOSIS — M79.671 FOOT PAIN, RIGHT: ICD-10-CM

## 2021-12-01 DIAGNOSIS — L97.512 ULCER OF RIGHT FOOT WITH FAT LAYER EXPOSED: Primary | ICD-10-CM

## 2021-12-01 DIAGNOSIS — R60.0 EDEMA LEG: ICD-10-CM

## 2021-12-01 DIAGNOSIS — L97.919 VENOUS ULCER OF RIGHT LEG: ICD-10-CM

## 2021-12-01 DIAGNOSIS — G60.8 PERIPHERAL SENSORY NEUROPATHY: ICD-10-CM

## 2021-12-01 DIAGNOSIS — I83.019 VENOUS ULCER OF RIGHT LEG: ICD-10-CM

## 2021-12-01 DIAGNOSIS — R60.0 BILATERAL LOWER EXTREMITY EDEMA: ICD-10-CM

## 2021-12-01 DIAGNOSIS — I87.2 VENOUS STASIS DERMATITIS OF BOTH LOWER EXTREMITIES: ICD-10-CM

## 2021-12-01 DIAGNOSIS — T14.8XXA TRAUMATIC ABRASION: ICD-10-CM

## 2021-12-01 DIAGNOSIS — S80.811A ABRASION OF ANTERIOR RIGHT LOWER LEG, INITIAL ENCOUNTER: ICD-10-CM

## 2021-12-01 PROCEDURE — 11042 DBRDMT SUBQ TIS 1ST 20SQCM/<: CPT | Mod: S$GLB,,, | Performed by: PODIATRIST

## 2021-12-01 PROCEDURE — 99214 OFFICE O/P EST MOD 30 MIN: CPT | Mod: 25,S$GLB,, | Performed by: PODIATRIST

## 2021-12-01 PROCEDURE — 11042 WOUND DEBRIDEMENT: ICD-10-PCS | Mod: S$GLB,,, | Performed by: PODIATRIST

## 2021-12-01 PROCEDURE — 99214 PR OFFICE/OUTPT VISIT, EST, LEVL IV, 30-39 MIN: ICD-10-PCS | Mod: 25,S$GLB,, | Performed by: PODIATRIST

## 2021-12-10 ENCOUNTER — DOCUMENT SCAN (OUTPATIENT)
Dept: HOME HEALTH SERVICES | Facility: HOSPITAL | Age: 86
End: 2021-12-10
Payer: MEDICARE

## 2021-12-14 ENCOUNTER — EXTERNAL HOME HEALTH (OUTPATIENT)
Dept: HOME HEALTH SERVICES | Facility: HOSPITAL | Age: 86
End: 2021-12-14
Payer: MEDICARE

## 2021-12-16 ENCOUNTER — TELEPHONE (OUTPATIENT)
Dept: FAMILY MEDICINE | Facility: CLINIC | Age: 86
End: 2021-12-16
Payer: MEDICARE

## 2021-12-23 ENCOUNTER — OFFICE VISIT (OUTPATIENT)
Dept: FAMILY MEDICINE | Facility: CLINIC | Age: 86
End: 2021-12-23
Payer: MEDICARE

## 2021-12-23 VITALS
SYSTOLIC BLOOD PRESSURE: 128 MMHG | WEIGHT: 220 LBS | HEIGHT: 68 IN | BODY MASS INDEX: 33.34 KG/M2 | HEART RATE: 70 BPM | DIASTOLIC BLOOD PRESSURE: 72 MMHG

## 2021-12-23 DIAGNOSIS — C44.92 SQUAMOUS CELL CARCINOMA WITH CUTANEOUS HORN FORMATION: ICD-10-CM

## 2021-12-23 DIAGNOSIS — L29.9 PRURITUS: ICD-10-CM

## 2021-12-23 DIAGNOSIS — I87.2 VENOUS INSUFFICIENCY OF BOTH LOWER EXTREMITIES: ICD-10-CM

## 2021-12-23 DIAGNOSIS — R41.89 COGNITIVE DECLINE: Primary | ICD-10-CM

## 2021-12-23 DIAGNOSIS — R54 ADVANCED AGE: ICD-10-CM

## 2021-12-23 DIAGNOSIS — N32.81 OAB (OVERACTIVE BLADDER): ICD-10-CM

## 2021-12-23 DIAGNOSIS — G62.9 PERIPHERAL POLYNEUROPATHY: ICD-10-CM

## 2021-12-23 PROCEDURE — 99214 OFFICE O/P EST MOD 30 MIN: CPT | Mod: S$GLB,,, | Performed by: NURSE PRACTITIONER

## 2021-12-23 PROCEDURE — 99214 PR OFFICE/OUTPT VISIT, EST, LEVL IV, 30-39 MIN: ICD-10-PCS | Mod: S$GLB,,, | Performed by: NURSE PRACTITIONER

## 2021-12-23 RX ORDER — HYDROXYZINE HYDROCHLORIDE 25 MG/1
25 TABLET, FILM COATED ORAL 3 TIMES DAILY PRN
Qty: 90 TABLET | Refills: 5 | Status: SHIPPED | OUTPATIENT
Start: 2021-12-23

## 2021-12-23 RX ORDER — HYDROCHLOROTHIAZIDE 25 MG/1
25 TABLET ORAL DAILY
Qty: 90 TABLET | Refills: 1 | Status: SHIPPED | OUTPATIENT
Start: 2021-12-23

## 2021-12-23 RX ORDER — TROSPIUM CHLORIDE 20 MG/1
20 TABLET, FILM COATED ORAL DAILY
Qty: 90 TABLET | Refills: 1 | Status: SHIPPED | OUTPATIENT
Start: 2021-12-23

## 2021-12-30 ENCOUNTER — TELEPHONE (OUTPATIENT)
Dept: FAMILY MEDICINE | Facility: CLINIC | Age: 86
End: 2021-12-30
Payer: MEDICARE

## 2022-01-03 ENCOUNTER — DOCUMENT SCAN (OUTPATIENT)
Dept: HOME HEALTH SERVICES | Facility: HOSPITAL | Age: 87
End: 2022-01-03
Payer: MEDICARE

## 2022-01-10 ENCOUNTER — DOCUMENT SCAN (OUTPATIENT)
Dept: HOME HEALTH SERVICES | Facility: HOSPITAL | Age: 87
End: 2022-01-10
Payer: MEDICARE

## 2022-04-12 ENCOUNTER — PES CALL (OUTPATIENT)
Dept: ADMINISTRATIVE | Facility: CLINIC | Age: 87
End: 2022-04-12
Payer: MEDICARE
